# Patient Record
Sex: MALE | Race: WHITE | NOT HISPANIC OR LATINO | Employment: OTHER | URBAN - METROPOLITAN AREA
[De-identification: names, ages, dates, MRNs, and addresses within clinical notes are randomized per-mention and may not be internally consistent; named-entity substitution may affect disease eponyms.]

---

## 2022-09-12 ENCOUNTER — HOSPITAL ENCOUNTER (EMERGENCY)
Facility: MEDICAL CENTER | Age: 67
End: 2022-09-12
Attending: EMERGENCY MEDICINE
Payer: MEDICARE

## 2022-09-12 VITALS
BODY MASS INDEX: 22.5 KG/M2 | WEIGHT: 151.9 LBS | TEMPERATURE: 98 F | OXYGEN SATURATION: 98 % | HEIGHT: 69 IN | SYSTOLIC BLOOD PRESSURE: 159 MMHG | HEART RATE: 64 BPM | DIASTOLIC BLOOD PRESSURE: 62 MMHG | RESPIRATION RATE: 16 BRPM

## 2022-09-12 DIAGNOSIS — R33.9 URINARY RETENTION: ICD-10-CM

## 2022-09-12 PROCEDURE — 99284 EMERGENCY DEPT VISIT MOD MDM: CPT

## 2022-09-12 PROCEDURE — 51798 US URINE CAPACITY MEASURE: CPT

## 2022-09-12 PROCEDURE — 303105 HCHG CATHETER EXTRA

## 2022-09-12 PROCEDURE — 96374 THER/PROPH/DIAG INJ IV PUSH: CPT

## 2022-09-12 PROCEDURE — 700101 HCHG RX REV CODE 250: Performed by: EMERGENCY MEDICINE

## 2022-09-12 PROCEDURE — 700111 HCHG RX REV CODE 636 W/ 250 OVERRIDE (IP): Performed by: EMERGENCY MEDICINE

## 2022-09-12 PROCEDURE — 51702 INSERT TEMP BLADDER CATH: CPT

## 2022-09-12 RX ORDER — HYDROMORPHONE HYDROCHLORIDE 1 MG/ML
1 INJECTION, SOLUTION INTRAMUSCULAR; INTRAVENOUS; SUBCUTANEOUS ONCE
Status: COMPLETED | OUTPATIENT
Start: 2022-09-12 | End: 2022-09-12

## 2022-09-12 RX ORDER — LIDOCAINE HYDROCHLORIDE 20 MG/ML
JELLY TOPICAL
Status: COMPLETED | OUTPATIENT
Start: 2022-09-12 | End: 2022-09-12

## 2022-09-12 RX ADMIN — LIDOCAINE HYDROCHLORIDE 1 DOSE: 20 JELLY TOPICAL at 05:56

## 2022-09-12 RX ADMIN — HYDROMORPHONE HYDROCHLORIDE 1 MG: 1 INJECTION, SOLUTION INTRAMUSCULAR; INTRAVENOUS; SUBCUTANEOUS at 05:56

## 2022-09-12 ASSESSMENT — PAIN DESCRIPTION - PAIN TYPE: TYPE: ACUTE PAIN

## 2022-09-12 NOTE — ED NOTES
Pt was educated on leg bag, cath care at home, and keeping the bag below the bladder level. Pt verbalized understanding. Leg bag applied.

## 2022-09-12 NOTE — ED NOTES
Pt verbalized understanding of d/c instructions and states his nephews wife is picking him up from the lobby. Pt left ambulatory with a steady gait. GCS 15. Leg bag in place, draining clear yellow urine.

## 2022-09-12 NOTE — ED PROVIDER NOTES
ED Provider Note    I briefly evaluated Mr. Yin while waiting for oncoming provider.  He is a 67-year-old man who has a history of urinary retention.  This past evening he voided at 7 PM.  A few hours later he awoke from sleep with a sensation to void and he has been unable to throughout the night.  Now having suprapubic discomfort.  He says previous catheter placements have been difficult and he is required pain medications to assist with placement.  We will attempt to place catheter with a coudé catheter.  IV will be inserted to give pain medication.  I have also ordered Urojet to assist with discomfort during Marcial placement.  Everette Kerr II, M.D. 9/12/2022 5:50 AM

## 2022-09-12 NOTE — ED PROVIDER NOTES
"ED Provider Note    CHIEF COMPLAINT  Chief Complaint   Patient presents with    Unable to Urinate     Last void 1900 9/11       HPI  Abebe Yin is a 67 y.o. male who presents with urinary retention.  Patient has a history of recurrent urinary retention.  He has had bladder stones.  He also has questionable prostate cancer and has a urologist out of town.  He states last night was the last time he urinated.  Woke up with intense urge to pee.  Could not void.  Came to the ER.  He denies fevers chills.  No preceding dysuria.  No flank pain.  Normal bowel movements.  No new medications.  No antihistamines.    REVIEW OF SYSTEMS  As above    PAST MEDICAL HISTORY  Past Medical History:   Diagnosis Date    Cancer (HCC)        SOCIAL HISTORY  Social History     Tobacco Use    Smoking status: Never    Smokeless tobacco: Never   Substance Use Topics    Alcohol use: Not Currently       SURGICAL HISTORY  History reviewed. No pertinent surgical history.    ALLERGIES  No Known Allergies    PHYSICAL EXAM  VITAL SIGNS: BP (!) 144/66   Pulse (!) 58   Temp 37.2 °C (98.9 °F) (Temporal)   Resp 18   Ht 1.753 m (5' 9\")   Wt 68.9 kg (151 lb 14.4 oz)   SpO2 95%   BMI 22.43 kg/m²    Constitutional: Awake and alert. Nontoxic  HENT:  Grossly normal  Eyes: Grossly normal  Neck: Normal range of motion  Cardiovascular: Normal heart rate   Thorax & Lungs: No respiratory distress  Abdomen: Nontender.  Distended bladder  Extremities: Well perfused  Psychiatric: Affect normal      COURSE & MEDICAL DECISION MAKING  Patient presents with acute urinary retention.  A Marcial catheter was placed.  Had a large amount of urine output.  His symptoms resolved.  No infectious symptoms.  Vital signs normal.  No new medications.  No specific cause this is a recurrent issue for him because of the large prostate by his report.  I asked him to follow-up with his urologist as soon as possible.  He will be given a leg bag and the Marcial will be left in " place.  He will return to the ER for retention despite catheter, fevers or other concerning symptoms.    FINAL IMPRESSION  1.  Acute urinary retention      Disposition: home in good condition      This dictation was created using voice recognition software. The accuracy of the dictation is limited to the abilities of the software.  The nursing notes were reviewed and certain aspects of this information were incorporated into this note.      Electronically signed by: Heber Galindo M.D., 9/12/2022 6:32 AM

## 2023-02-03 ENCOUNTER — APPOINTMENT (OUTPATIENT)
Dept: MEDICAL GROUP | Facility: MEDICAL CENTER | Age: 68
End: 2023-02-03
Payer: MEDICARE

## 2023-04-07 ENCOUNTER — HOSPITAL ENCOUNTER (OUTPATIENT)
Dept: RADIOLOGY | Facility: MEDICAL CENTER | Age: 68
End: 2023-04-07
Attending: INTERNAL MEDICINE
Payer: MEDICARE

## 2023-04-07 DIAGNOSIS — D75.81 MYELOFIBROSIS-OSTEOSCLEROSIS (HCC): ICD-10-CM

## 2023-04-07 PROCEDURE — 700117 HCHG RX CONTRAST REV CODE 255: Performed by: INTERNAL MEDICINE

## 2023-04-07 PROCEDURE — 71260 CT THORAX DX C+: CPT

## 2023-04-07 RX ADMIN — IOHEXOL 100 ML: 350 INJECTION, SOLUTION INTRAVENOUS at 16:30

## 2023-04-07 RX ADMIN — IOHEXOL 25 ML: 240 INJECTION, SOLUTION INTRATHECAL; INTRAVASCULAR; INTRAVENOUS; ORAL at 16:30

## 2023-07-05 ENCOUNTER — HOSPITAL ENCOUNTER (OUTPATIENT)
Dept: LAB | Facility: MEDICAL CENTER | Age: 68
End: 2023-07-05
Attending: INTERNAL MEDICINE
Payer: MEDICARE

## 2023-07-05 LAB
ALBUMIN SERPL BCP-MCNC: 4.8 G/DL (ref 3.2–4.9)
ALBUMIN/GLOB SERPL: 1.9 G/DL
ALP SERPL-CCNC: 95 U/L (ref 30–99)
ALT SERPL-CCNC: 20 U/L (ref 2–50)
ANION GAP SERPL CALC-SCNC: 12 MMOL/L (ref 7–16)
AST SERPL-CCNC: 15 U/L (ref 12–45)
BASOPHILS # BLD AUTO: 0.3 % (ref 0–1.8)
BASOPHILS # BLD: 0.02 K/UL (ref 0–0.12)
BILIRUB SERPL-MCNC: 1.4 MG/DL (ref 0.1–1.5)
BUN SERPL-MCNC: 18 MG/DL (ref 8–22)
CALCIUM ALBUM COR SERPL-MCNC: 8.7 MG/DL (ref 8.5–10.5)
CALCIUM SERPL-MCNC: 9.3 MG/DL (ref 8.4–10.2)
CHLORIDE SERPL-SCNC: 107 MMOL/L (ref 96–112)
CO2 SERPL-SCNC: 22 MMOL/L (ref 20–33)
CREAT SERPL-MCNC: 0.76 MG/DL (ref 0.5–1.4)
EOSINOPHIL # BLD AUTO: 0.04 K/UL (ref 0–0.51)
EOSINOPHIL NFR BLD: 0.7 % (ref 0–6.9)
ERYTHROCYTE [DISTWIDTH] IN BLOOD BY AUTOMATED COUNT: 88.1 FL (ref 35.9–50)
FASTING STATUS PATIENT QL REPORTED: NORMAL
GFR SERPLBLD CREATININE-BSD FMLA CKD-EPI: 99 ML/MIN/1.73 M 2
GLOBULIN SER CALC-MCNC: 2.5 G/DL (ref 1.9–3.5)
GLUCOSE SERPL-MCNC: 128 MG/DL (ref 65–99)
HCT VFR BLD AUTO: 36.8 % (ref 42–52)
HGB BLD-MCNC: 12.6 G/DL (ref 14–18)
IMM GRANULOCYTES # BLD AUTO: 0.05 K/UL (ref 0–0.11)
IMM GRANULOCYTES NFR BLD AUTO: 0.8 % (ref 0–0.9)
LDH SERPL L TO P-CCNC: 165 U/L (ref 107–266)
LYMPHOCYTES # BLD AUTO: 3.13 K/UL (ref 1–4.8)
LYMPHOCYTES NFR BLD: 52.4 % (ref 22–41)
MCH RBC QN AUTO: 33.7 PG (ref 27–33)
MCHC RBC AUTO-ENTMCNC: 34.2 G/DL (ref 32.3–36.5)
MCV RBC AUTO: 98.4 FL (ref 81.4–97.8)
MONOCYTES # BLD AUTO: 0.89 K/UL (ref 0–0.85)
MONOCYTES NFR BLD AUTO: 14.9 % (ref 0–13.4)
NEUTROPHILS # BLD AUTO: 1.84 K/UL (ref 1.82–7.42)
NEUTROPHILS NFR BLD: 30.9 % (ref 44–72)
NRBC # BLD AUTO: 0.03 K/UL
NRBC BLD-RTO: 0.5 /100 WBC (ref 0–0.2)
PLATELET # BLD AUTO: 119 K/UL (ref 164–446)
PMV BLD AUTO: 11.1 FL (ref 9–12.9)
POTASSIUM SERPL-SCNC: 4.4 MMOL/L (ref 3.6–5.5)
PROT SERPL-MCNC: 7.3 G/DL (ref 6–8.2)
RBC # BLD AUTO: 3.74 M/UL (ref 4.7–6.1)
SODIUM SERPL-SCNC: 141 MMOL/L (ref 135–145)
WBC # BLD AUTO: 6 K/UL (ref 4.8–10.8)

## 2023-07-05 PROCEDURE — 83615 LACTATE (LD) (LDH) ENZYME: CPT

## 2023-07-05 PROCEDURE — 80053 COMPREHEN METABOLIC PANEL: CPT

## 2023-07-05 PROCEDURE — 36415 COLL VENOUS BLD VENIPUNCTURE: CPT

## 2023-07-05 PROCEDURE — 85025 COMPLETE CBC W/AUTO DIFF WBC: CPT

## 2023-07-10 ENCOUNTER — HOSPITAL ENCOUNTER (OUTPATIENT)
Dept: RADIOLOGY | Facility: MEDICAL CENTER | Age: 68
End: 2023-07-10
Attending: NURSE PRACTITIONER
Payer: MEDICARE

## 2023-07-10 DIAGNOSIS — M26.622 ARTHRALGIA OF LEFT TEMPOROMANDIBULAR JOINT: ICD-10-CM

## 2023-07-10 DIAGNOSIS — R22.0 FACIAL SWELLING: ICD-10-CM

## 2023-07-10 PROCEDURE — 700117 HCHG RX CONTRAST REV CODE 255: Performed by: NURSE PRACTITIONER

## 2023-07-10 PROCEDURE — 70487 CT MAXILLOFACIAL W/DYE: CPT

## 2023-07-10 RX ADMIN — IOHEXOL 80 ML: 350 INJECTION, SOLUTION INTRAVENOUS at 18:22

## 2023-08-06 ENCOUNTER — HOSPITAL ENCOUNTER (EMERGENCY)
Facility: MEDICAL CENTER | Age: 68
End: 2023-08-06
Attending: EMERGENCY MEDICINE
Payer: MEDICARE

## 2023-08-06 VITALS
SYSTOLIC BLOOD PRESSURE: 134 MMHG | DIASTOLIC BLOOD PRESSURE: 76 MMHG | TEMPERATURE: 97 F | RESPIRATION RATE: 16 BRPM | HEIGHT: 69 IN | BODY MASS INDEX: 21.98 KG/M2 | WEIGHT: 148.37 LBS | HEART RATE: 64 BPM | OXYGEN SATURATION: 98 %

## 2023-08-06 DIAGNOSIS — R33.9 URINARY RETENTION: ICD-10-CM

## 2023-08-06 LAB
ANION GAP SERPL CALC-SCNC: 10 MMOL/L (ref 7–16)
APPEARANCE UR: CLEAR
BACTERIA #/AREA URNS HPF: NEGATIVE /HPF
BASOPHILS # BLD AUTO: 0 % (ref 0–1.8)
BASOPHILS # BLD: 0 K/UL (ref 0–0.12)
BILIRUB UR QL STRIP.AUTO: NEGATIVE
BUN SERPL-MCNC: 20 MG/DL (ref 8–22)
CALCIUM SERPL-MCNC: 9 MG/DL (ref 8.4–10.2)
CHLORIDE SERPL-SCNC: 107 MMOL/L (ref 96–112)
CO2 SERPL-SCNC: 23 MMOL/L (ref 20–33)
COLOR UR: YELLOW
CREAT SERPL-MCNC: 0.81 MG/DL (ref 0.5–1.4)
EOSINOPHIL # BLD AUTO: 0 K/UL (ref 0–0.51)
EOSINOPHIL NFR BLD: 0 % (ref 0–6.9)
EPI CELLS #/AREA URNS HPF: ABNORMAL /HPF
ERYTHROCYTE [DISTWIDTH] IN BLOOD BY AUTOMATED COUNT: 93.1 FL (ref 35.9–50)
GFR SERPLBLD CREATININE-BSD FMLA CKD-EPI: 97 ML/MIN/1.73 M 2
GLUCOSE SERPL-MCNC: 116 MG/DL (ref 65–99)
GLUCOSE UR STRIP.AUTO-MCNC: NEGATIVE MG/DL
HCT VFR BLD AUTO: 32.4 % (ref 42–52)
HGB BLD-MCNC: 11 G/DL (ref 14–18)
KETONES UR STRIP.AUTO-MCNC: ABNORMAL MG/DL
LEUKOCYTE ESTERASE UR QL STRIP.AUTO: NEGATIVE
LYMPHOCYTES # BLD AUTO: 1.18 K/UL (ref 1–4.8)
LYMPHOCYTES NFR BLD: 37 % (ref 22–41)
MANUAL DIFF BLD: NORMAL
MCH RBC QN AUTO: 34.4 PG (ref 27–33)
MCHC RBC AUTO-ENTMCNC: 34 G/DL (ref 32.3–36.5)
MCV RBC AUTO: 101.3 FL (ref 81.4–97.8)
MICRO URNS: ABNORMAL
MONOCYTES # BLD AUTO: 0.38 K/UL (ref 0–0.85)
MONOCYTES NFR BLD AUTO: 12 % (ref 0–13.4)
MUCOUS THREADS #/AREA URNS HPF: ABNORMAL /HPF
NEUTROPHILS # BLD AUTO: 1.63 K/UL (ref 1.82–7.42)
NEUTROPHILS NFR BLD: 51 % (ref 44–72)
NITRITE UR QL STRIP.AUTO: NEGATIVE
NRBC # BLD AUTO: 0.07 K/UL
NRBC BLD-RTO: 2.2 /100 WBC (ref 0–0.2)
PH UR STRIP.AUTO: 6.5 [PH] (ref 5–8)
PLATELET # BLD AUTO: 97 K/UL (ref 164–446)
PLATELET BLD QL SMEAR: NORMAL
PMV BLD AUTO: 11.6 FL (ref 9–12.9)
POTASSIUM SERPL-SCNC: 3.9 MMOL/L (ref 3.6–5.5)
PROT UR QL STRIP: NEGATIVE MG/DL
RBC # BLD AUTO: 3.2 M/UL (ref 4.7–6.1)
RBC # URNS HPF: ABNORMAL /HPF
RBC BLD AUTO: NORMAL
RBC UR QL AUTO: ABNORMAL
SODIUM SERPL-SCNC: 140 MMOL/L (ref 135–145)
SP GR UR STRIP.AUTO: 1.02
WBC # BLD AUTO: 3.2 K/UL (ref 4.8–10.8)
WBC #/AREA URNS HPF: ABNORMAL /HPF

## 2023-08-06 PROCEDURE — 700111 HCHG RX REV CODE 636 W/ 250 OVERRIDE (IP): Mod: JZ | Performed by: EMERGENCY MEDICINE

## 2023-08-06 PROCEDURE — 303105 HCHG CATHETER EXTRA

## 2023-08-06 PROCEDURE — 51702 INSERT TEMP BLADDER CATH: CPT

## 2023-08-06 PROCEDURE — 99284 EMERGENCY DEPT VISIT MOD MDM: CPT

## 2023-08-06 PROCEDURE — 85007 BL SMEAR W/DIFF WBC COUNT: CPT

## 2023-08-06 PROCEDURE — 96374 THER/PROPH/DIAG INJ IV PUSH: CPT

## 2023-08-06 PROCEDURE — 81001 URINALYSIS AUTO W/SCOPE: CPT

## 2023-08-06 PROCEDURE — 85025 COMPLETE CBC W/AUTO DIFF WBC: CPT

## 2023-08-06 PROCEDURE — 36415 COLL VENOUS BLD VENIPUNCTURE: CPT

## 2023-08-06 PROCEDURE — 80048 BASIC METABOLIC PNL TOTAL CA: CPT

## 2023-08-06 PROCEDURE — 51798 US URINE CAPACITY MEASURE: CPT

## 2023-08-06 RX ADMIN — FENTANYL CITRATE 50 MCG: 50 INJECTION, SOLUTION INTRAMUSCULAR; INTRAVENOUS at 14:34

## 2023-08-06 ASSESSMENT — FIBROSIS 4 INDEX: FIB4 SCORE: 1.86

## 2023-08-06 NOTE — ED TRIAGE NOTES
"Patient presents with complaints of difficulty urinating. Patient is currently in the middle of radiation treatment for his prostate. States that he hasn't been able to void fully since Friday. Reports small \"dribbles\" over noc but continues to feel full.  "

## 2023-08-06 NOTE — ED PROVIDER NOTES
"ED Provider Note    CHIEF COMPLAINT  Chief Complaint   Patient presents with    Urinary Retention     Last \"normal\" void was 2 days ago       EXTERNAL RECORDS REVIEWED  Outpatient Notes with urology, Dr. Maldonado March 2023, history of prostate cancer    HPI/ROS  LIMITATION TO HISTORY   Select: : None  OUTSIDE HISTORIAN(S):  none    Abebe Yin is a 68 y.o. male who presents with urinary retention.  Patient has a history of prostate cancer currently undergoing treatment.  He reports over the last 7 to 8 days he has noticed some difficulty urinating to the point that since Friday he is really only had a few dribbles and is starting to feel some suprapubic pressure as well.  He reports this is happened before.  No fevers or chills, no nausea or vomiting.  He does have some pain with urination he reports ever since he started his treatment for prostate cancer.  No blood in his urine    PAST MEDICAL HISTORY   has a past medical history of Cancer (HCC).    SURGICAL HISTORY  patient denies any surgical history    FAMILY HISTORY  No family history on file.    SOCIAL HISTORY  Social History     Tobacco Use    Smoking status: Never    Smokeless tobacco: Never   Vaping Use    Vaping Use: Not on file   Substance and Sexual Activity    Alcohol use: Not Currently    Drug use: Not on file    Sexual activity: Not on file       CURRENT MEDICATIONS  Home Medications       Reviewed by Celeste Miner (Pharmacy Tech) on 08/06/23 at 1600  Med List Status: Complete     Medication Last Dose Status   atorvastatin (LIPITOR) 20 MG Tab 8/5/2023 Active   clopidogrel (PLAVIX) 75 MG Tab 8/6/2023 Active   meloxicam (MOBIC) 15 MG tablet 8/5/2023 Active   metformin (GLUCOPHAGE) 1000 MG tablet 8/6/2023 Active   metoprolol tartrate (LOPRESSOR) 25 MG Tab 8/6/2023 Active   potassium citrate SR (UROCIT-K SR) 10 MEQ (1080 MG) Tab CR 8/6/2023 Active                    ALLERGIES  No Known Allergies    PHYSICAL EXAM  VITAL SIGNS: /76   " "Pulse 64   Temp 36.1 °C (97 °F) (Temporal)   Resp 16   Ht 1.753 m (5' 9\")   Wt 67.3 kg (148 lb 5.9 oz)   SpO2 98%   BMI 21.91 kg/m²      Pulse ox interpretation: I interpret this pulse ox as normal.  Constitutional: Alert in no apparent distress.  HENT: No signs of trauma, Bilateral external ears normal, Nose normal.   Eyes: Pupils are equal and reactive, Conjunctiva normal, Non-icteric.   Cardiovascular: Regular rate and rhythm, no murmurs.   Thorax & Lungs: Normal breath sounds, No respiratory distress, No wheezing, No chest tenderness.   Abdomen: Bowel sounds normal, Soft, suprapubic fullness and tenderness, No masses, No pulsatile masses. No peritoneal signs.  Skin: Warm, Dry, No erythema, No rash.   Back: No bony tenderness, No CVA tenderness.   Extremities: Intact distal pulses, No edema, No tenderness, No cyanosis,  Negative Andres's sign.   Neurologic: Alert , Normal motor function, Normal sensory function, No focal deficits noted.   Psychiatric: Affect normal, Judgment normal, Mood normal.         DIAGNOSTIC STUDIES / PROCEDURES  Labs Reviewed   URINALYSIS - Abnormal; Notable for the following components:       Result Value    Ketones Trace (*)     Occult Blood Small (*)     All other components within normal limits   BASIC METABOLIC PANEL - Abnormal; Notable for the following components:    Glucose 116 (*)     All other components within normal limits   URINE MICROSCOPIC (W/UA) - Abnormal; Notable for the following components:    WBC 0-2 (*)     RBC 5-10 (*)     All other components within normal limits   CBC WITH DIFFERENTIAL - Abnormal; Notable for the following components:    WBC 3.2 (*)     RBC 3.20 (*)     Hemoglobin 11.0 (*)     Hematocrit 32.4 (*)     .3 (*)     MCH 34.4 (*)     RDW 93.1 (*)     Platelet Count 97 (*)     Nucleated RBC 2.20 (*)     Neutrophils (Absolute) 1.63 (*)     All other components within normal limits   ESTIMATED GFR   DIFFERENTIAL MANUAL   PLATELET ESTIMATE "   MORPHOLOGY             COURSE & MEDICAL DECISION MAKING      INITIAL ASSESSMENT, COURSE AND PLAN  Care Narrative: 2:22 PM  Patient is evaluated at bedside, at this point differential includes acute urinary retention, obstructive nephropathy, urinary tract infection.  We will plan for bladder scan, urinalysis, metabolic panel.      2:32 PM  Patient's bladder scan shows retention of greater than 500 cc.  We will plan for Marcial, fentanyl for Marcial as he reports he has had significant pain with Marcial placement in the past    3:14 PM  Patient is reevaluated, states he feels much better, Marcial is draining clear urine, no blood.  He does report that he is supposed to get a CBC tomorrow so we will order this now for outpatient follow-up           PROBLEM LIST  #Urinary retention--no findings of concomitant urinary tract infection or obstructive nephropathy.  He already has follow-up scheduled with urology.  We will continue with leg bag and indwelling Marcial     ADDITIONAL PROBLEM LIST  #Prostate cancer, currently followed by urology undergoing treatment      DISPOSITION AND DISCUSSIONS      Escalation of care considered, and ultimately not performed:acute inpatient care management, however at this time, the patient is most appropriate for outpatient management as the urinary obstruction was able to be relieved    Barriers to care at this time, including but not limited to:  none .     Decision tools and prescription drugs considered including, but not limited to: Antibiotics considered but urinalysis shows no findings of infection .     The patient will return for new or worsening symptoms and is stable at the time of discharge.    The patient is referred to a primary physician for blood pressure management, diabetic screening, and for all other preventative health concerns.        DISPOSITION:  Patient will be discharged home in stable condition.    FOLLOW UP:  [facility]  [address] [place]  [address]  [phone]    In 1 week        OUTPATIENT MEDICATIONS:  New Prescriptions    No medications on file         FINAL DIAGNOSIS  1. Urinary retention           Electronically signed by: Wilfredo Frausto M.D., 8/6/2023 2:21 PM

## 2023-09-09 ENCOUNTER — HOSPITAL ENCOUNTER (EMERGENCY)
Facility: MEDICAL CENTER | Age: 68
End: 2023-09-09
Attending: EMERGENCY MEDICINE
Payer: MEDICARE

## 2023-09-09 VITALS
DIASTOLIC BLOOD PRESSURE: 64 MMHG | RESPIRATION RATE: 18 BRPM | TEMPERATURE: 96.7 F | OXYGEN SATURATION: 98 % | WEIGHT: 152.34 LBS | BODY MASS INDEX: 21.81 KG/M2 | HEART RATE: 70 BPM | HEIGHT: 70 IN | SYSTOLIC BLOOD PRESSURE: 115 MMHG

## 2023-09-09 DIAGNOSIS — R33.8 ACUTE URINARY RETENTION: ICD-10-CM

## 2023-09-09 LAB
ALBUMIN SERPL BCP-MCNC: 4.8 G/DL (ref 3.2–4.9)
ALBUMIN/GLOB SERPL: 1.8 G/DL
ALP SERPL-CCNC: 90 U/L (ref 30–99)
ALT SERPL-CCNC: 15 U/L (ref 2–50)
ANION GAP SERPL CALC-SCNC: 10 MMOL/L (ref 7–16)
APPEARANCE UR: CLEAR
AST SERPL-CCNC: 13 U/L (ref 12–45)
BASOPHILS # BLD AUTO: 0.3 % (ref 0–1.8)
BASOPHILS # BLD: 0.01 K/UL (ref 0–0.12)
BILIRUB SERPL-MCNC: 1 MG/DL (ref 0.1–1.5)
BILIRUB UR QL STRIP.AUTO: NEGATIVE
BUN SERPL-MCNC: 16 MG/DL (ref 8–22)
CALCIUM ALBUM COR SERPL-MCNC: 8.2 MG/DL (ref 8.5–10.5)
CALCIUM SERPL-MCNC: 8.8 MG/DL (ref 8.4–10.2)
CHLORIDE SERPL-SCNC: 102 MMOL/L (ref 96–112)
CO2 SERPL-SCNC: 22 MMOL/L (ref 20–33)
COLOR UR: YELLOW
CREAT SERPL-MCNC: 0.79 MG/DL (ref 0.5–1.4)
EOSINOPHIL # BLD AUTO: 0.03 K/UL (ref 0–0.51)
EOSINOPHIL NFR BLD: 0.8 % (ref 0–6.9)
ERYTHROCYTE [DISTWIDTH] IN BLOOD BY AUTOMATED COUNT: 92.5 FL (ref 35.9–50)
GFR SERPLBLD CREATININE-BSD FMLA CKD-EPI: 98 ML/MIN/1.73 M 2
GLOBULIN SER CALC-MCNC: 2.7 G/DL (ref 1.9–3.5)
GLUCOSE SERPL-MCNC: 157 MG/DL (ref 65–99)
GLUCOSE UR STRIP.AUTO-MCNC: NEGATIVE MG/DL
HCT VFR BLD AUTO: 34.9 % (ref 42–52)
HGB BLD-MCNC: 11.8 G/DL (ref 14–18)
IMM GRANULOCYTES # BLD AUTO: 0.08 K/UL (ref 0–0.11)
IMM GRANULOCYTES NFR BLD AUTO: 2.1 % (ref 0–0.9)
KETONES UR STRIP.AUTO-MCNC: NEGATIVE MG/DL
LEUKOCYTE ESTERASE UR QL STRIP.AUTO: NEGATIVE
LYMPHOCYTES # BLD AUTO: 0.57 K/UL (ref 1–4.8)
LYMPHOCYTES NFR BLD: 15 % (ref 22–41)
MCH RBC QN AUTO: 33.4 PG (ref 27–33)
MCHC RBC AUTO-ENTMCNC: 33.8 G/DL (ref 32.3–36.5)
MCV RBC AUTO: 98.9 FL (ref 81.4–97.8)
MICRO URNS: NORMAL
MONOCYTES # BLD AUTO: 0.75 K/UL (ref 0–0.85)
MONOCYTES NFR BLD AUTO: 19.8 % (ref 0–13.4)
NEUTROPHILS # BLD AUTO: 2.35 K/UL (ref 1.82–7.42)
NEUTROPHILS NFR BLD: 62 % (ref 44–72)
NITRITE UR QL STRIP.AUTO: NEGATIVE
NRBC # BLD AUTO: 0.03 K/UL
NRBC BLD-RTO: 0.8 /100 WBC (ref 0–0.2)
PH UR STRIP.AUTO: 6.5 [PH] (ref 5–8)
PLATELET # BLD AUTO: 91 K/UL (ref 164–446)
PLATELETS.RETICULATED NFR BLD AUTO: 10.8 % (ref 0.6–13.1)
PMV BLD AUTO: 11.5 FL (ref 9–12.9)
POTASSIUM SERPL-SCNC: 4.4 MMOL/L (ref 3.6–5.5)
PROT SERPL-MCNC: 7.5 G/DL (ref 6–8.2)
PROT UR QL STRIP: NEGATIVE MG/DL
RBC # BLD AUTO: 3.53 M/UL (ref 4.7–6.1)
RBC UR QL AUTO: NEGATIVE
SODIUM SERPL-SCNC: 134 MMOL/L (ref 135–145)
SP GR UR STRIP.AUTO: 1.01
WBC # BLD AUTO: 3.8 K/UL (ref 4.8–10.8)

## 2023-09-09 PROCEDURE — 85055 RETICULATED PLATELET ASSAY: CPT

## 2023-09-09 PROCEDURE — 87086 URINE CULTURE/COLONY COUNT: CPT

## 2023-09-09 PROCEDURE — 51702 INSERT TEMP BLADDER CATH: CPT

## 2023-09-09 PROCEDURE — 81003 URINALYSIS AUTO W/O SCOPE: CPT

## 2023-09-09 PROCEDURE — 303105 HCHG CATHETER EXTRA

## 2023-09-09 PROCEDURE — 85025 COMPLETE CBC W/AUTO DIFF WBC: CPT

## 2023-09-09 PROCEDURE — 80053 COMPREHEN METABOLIC PANEL: CPT

## 2023-09-09 PROCEDURE — 99285 EMERGENCY DEPT VISIT HI MDM: CPT

## 2023-09-09 PROCEDURE — 51798 US URINE CAPACITY MEASURE: CPT

## 2023-09-09 ASSESSMENT — FIBROSIS 4 INDEX: FIB4 SCORE: 2.28

## 2023-09-09 NOTE — ED PROVIDER NOTES
ED Provider Note    CHIEF COMPLAINT  Chief Complaint   Patient presents with    Unable to Urinate     Last void approx 2000  Pt has had 6 catheters in place        EXTERNAL RECORDS REVIEWED  Reviewed previous urological visits ER visits from August 2023    HPI/ROS  LIMITATION TO HISTORY   None none  OUTSIDE HISTORIAN(S):  None    Abebe Yin is a 68 y.o. male who presents this is a very pleasant 68-year-old gentleman with a history of prostate disease who has had issues with urinary retention in the past.  He has required several emergent catheters most recently approximately a month ago.  He has been referred to urology and is on tamsulosin and is under treatment for prostate cancer followed by Dr. Maldonado with urology.  He has been doing quite well but at approximately 8:00 last night noticed that he could not void he waited throughout the night tried several times but presents here in acute urinary retention.  He denies high fevers or chills.  No change in medications no missed doses of Flomax.    PAST MEDICAL HISTORY   has a past medical history of Cancer (HCC).  Prostate cancer  SURGICAL HISTORY  patient denies any surgical history    FAMILY HISTORY  No family history on file.    SOCIAL HISTORY  Social History     Tobacco Use    Smoking status: Never    Smokeless tobacco: Never   Vaping Use    Vaping Use: Not on file   Substance and Sexual Activity    Alcohol use: Not Currently    Drug use: Not on file    Sexual activity: Not on file       CURRENT MEDICATIONS  Home Medications    **Home medications have not yet been reviewed for this encounter**     No current facility-administered medications for this encounter.    Current Outpatient Medications:     metoprolol tartrate (LOPRESSOR) 25 MG Tab, Take 25 mg by mouth 2 times a day., Disp: , Rfl:     atorvastatin (LIPITOR) 20 MG Tab, Take 20 mg by mouth at bedtime., Disp: , Rfl:     clopidogrel (PLAVIX) 75 MG Tab, Take 75 mg by mouth every morning., Disp:  ", Rfl:     metformin (GLUCOPHAGE) 1000 MG tablet, Take 1,000 mg by mouth 2 times a day with meals., Disp: , Rfl:     potassium citrate SR (UROCIT-K SR) 10 MEQ (1080 MG) Tab CR, Take 10 mEq by mouth 2 times a day., Disp: , Rfl:     meloxicam (MOBIC) 15 MG tablet, Take 15 mg by mouth at bedtime., Disp: , Rfl:       ALLERGIES  No Known Allergies    PHYSICAL EXAM  VITAL SIGNS: BP (!) 162/81   Pulse 70   Temp 35.9 °C (96.7 °F) (Temporal)   Resp (!) 22   Ht 1.778 m (5' 10\")   Wt 69.1 kg (152 lb 5.4 oz)   SpO2 98%   BMI 21.86 kg/m²    Pulse ox interpretation: I interpret this pulse ox as normal.  Constitutional: Alert and oriented x 3, patient appears to be in acute distress  HEENT: Atraumatic normocephalic, pupils are equal round reactive to light extraocular movements are intact. The nares is clear, external ears are normal, mouth shows moist mucous membranes normal dentition for age  Neck: Supple, no JVD no tracheal deviation  Cardiovascular: Regular rate and rhythm no murmur rub or gallop 2+ pulses peripherally x4  Thorax & Lungs: No respiratory distress, no wheezes rales or rhonchi, No chest tenderness.   GI: Suprapubic discomfort without rebound guarding or distention distention in the lower abdomen is noted   skin: Warm dry no acute rash or lesion  Musculoskeletal: Moving all extremities with full range and 5 of 5 strength no acute  deformity  Neurologic: Cranial nerves III through XII are grossly intact no sensory deficit no cerebellar dysfunction   Psychiatric: Appropriate affect for situation at this time          DIAGNOSTIC STUDIES / PROCEDURES  Bladder scan demonstrates 750 cc of retained urine  LABS  Results for orders placed or performed during the hospital encounter of 09/09/23   URINALYSIS (UA)    Specimen: Urine, Marcial Cath   Result Value Ref Range    Color Yellow     Character Clear     Specific Gravity 1.015 <1.035    Ph 6.5 5.0 - 8.0    Glucose Negative Negative mg/dL    Ketones Negative Negative " mg/dL    Protein Negative Negative mg/dL    Bilirubin Negative Negative    Nitrite Negative Negative    Leukocyte Esterase Negative Negative    Occult Blood Negative Negative    Micro Urine Req see below    CBC WITH DIFFERENTIAL   Result Value Ref Range    WBC 3.8 (L) 4.8 - 10.8 K/uL    RBC 3.53 (L) 4.70 - 6.10 M/uL    Hemoglobin 11.8 (L) 14.0 - 18.0 g/dL    Hematocrit 34.9 (L) 42.0 - 52.0 %    MCV 98.9 (H) 81.4 - 97.8 fL    MCH 33.4 (H) 27.0 - 33.0 pg    MCHC 33.8 32.3 - 36.5 g/dL    RDW 92.5 (H) 35.9 - 50.0 fL    Platelet Count 91 (L) 164 - 446 K/uL    MPV 11.5 9.0 - 12.9 fL    Neutrophils-Polys 62.00 44.00 - 72.00 %    Lymphocytes 15.00 (L) 22.00 - 41.00 %    Monocytes 19.80 (H) 0.00 - 13.40 %    Eosinophils 0.80 0.00 - 6.90 %    Basophils 0.30 0.00 - 1.80 %    Immature Granulocytes 2.10 (H) 0.00 - 0.90 %    Nucleated RBC 0.80 (H) 0.00 - 0.20 /100 WBC    Neutrophils (Absolute) 2.35 1.82 - 7.42 K/uL    Lymphs (Absolute) 0.57 (L) 1.00 - 4.80 K/uL    Monos (Absolute) 0.75 0.00 - 0.85 K/uL    Eos (Absolute) 0.03 0.00 - 0.51 K/uL    Baso (Absolute) 0.01 0.00 - 0.12 K/uL    Immature Granulocytes (abs) 0.08 0.00 - 0.11 K/uL    NRBC (Absolute) 0.03 K/uL   Comp Metabolic Panel   Result Value Ref Range    Sodium 134 (L) 135 - 145 mmol/L    Potassium 4.4 3.6 - 5.5 mmol/L    Chloride 102 96 - 112 mmol/L    Co2 22 20 - 33 mmol/L    Anion Gap 10.0 7.0 - 16.0    Glucose 157 (H) 65 - 99 mg/dL    Bun 16 8 - 22 mg/dL    Creatinine 0.79 0.50 - 1.40 mg/dL    Calcium 8.8 8.4 - 10.2 mg/dL    Correct Calcium 8.2 (L) 8.5 - 10.5 mg/dL    AST(SGOT) 13 12 - 45 U/L    ALT(SGPT) 15 2 - 50 U/L    Alkaline Phosphatase 90 30 - 99 U/L    Total Bilirubin 1.0 0.1 - 1.5 mg/dL    Albumin 4.8 3.2 - 4.9 g/dL    Total Protein 7.5 6.0 - 8.2 g/dL    Globulin 2.7 1.9 - 3.5 g/dL    A-G Ratio 1.8 g/dL   IMMATURE PLT FRACTION   Result Value Ref Range    Imm. Plt Fraction 10.8 0.6 - 13.1 %   ESTIMATED GFR   Result Value Ref Range    GFR (CKD-EPI) 98 >60  mL/min/1.73 m 2        RADIOLOGY  I have independently interpreted the diagnostic imaging associated with this visit and am waiting the   Bladder scan revealed 750 cc of retained urine  COURSE & MEDICAL DECISION MAKING    ED Observation Status? No; Patient does not meet criteria for ED Observation.     INITIAL ASSESSMENT, COURSE AND PLAN  Care Narrative:     This is a very pleasant 68-year-old gentleman with a known history of prostate cancer as well as intermittent urinary retention who presents here with severe lower abdominal pain and inability to urinate.  I was concerned about possible hematuria, UTI obstructive uropathy or other etiology of discomfort.  He was quite obvious when the bladder scan revealed 750 cc of retained urine that he was in urinary retention.  Subsequent order for Marcial catheter was successful and the nursing staff placed a Marcial catheter.  He drained around 600 cc of clear yellow urine without hematuria.  His laboratory studies including CBC and metabolic panel are reassuring.  Of note the patient has chronically low white count and did have some immature granulocytes.  He has no signs of sepsis.  I will add on urine culture but there is no clear evidence of UTI.  Metabolic panel is reassuring there is no evidence of acute kidney injury.  Feels much improved and will be discharged home.  I think we can hold off on antibiotic therapy pending urine culture as he has no leukocytosis fever or obvious signs of UTI and has not had previous urinary tract sections related to acute retention in the past.  He already has follow-up with urology      ADDITIONAL PROBLEM LIST    DISPOSITION AND DISCUSSIONS  I have discussed management of the patient with the following physicians and ROBERT's: None    Discussion of management with other QHP or appropriate source(s): None not applicable    Escalation of care considered, and ultimately not performed: Not applicable    Barriers to care at this time, including  but not limited to: Not applicable    Decision tools and prescription drugs considered including, but not limited to: None    FINAL DIAGNOSIS  Acute urinary retention  History of prostate cancer       Electronically signed by: Tay Bernard M.D., 9/9/2023 6:34 AM

## 2023-09-09 NOTE — ED NOTES
16 Fr oh cath inserted pt tolerated well clear yellow urine draining to gravity  PIV established blood colleted and sent to lab

## 2023-09-09 NOTE — ED TRIAGE NOTES
Last void approx 2000   Pt has had 6 catheters in the past  Pt is currently receiving treatment for prostate Cancer.

## 2023-09-11 LAB
BACTERIA UR CULT: NORMAL
SIGNIFICANT IND 70042: NORMAL
SITE SITE: NORMAL
SOURCE SOURCE: NORMAL

## 2023-10-02 ENCOUNTER — HOSPITAL ENCOUNTER (OUTPATIENT)
Dept: LAB | Facility: MEDICAL CENTER | Age: 68
End: 2023-10-02
Attending: INTERNAL MEDICINE
Payer: MEDICARE

## 2023-10-02 LAB
ALBUMIN SERPL BCP-MCNC: 4.9 G/DL (ref 3.2–4.9)
ALBUMIN/GLOB SERPL: 1.8 G/DL
ALP SERPL-CCNC: 107 U/L (ref 30–99)
ALT SERPL-CCNC: 10 U/L (ref 2–50)
ANION GAP SERPL CALC-SCNC: 13 MMOL/L (ref 7–16)
AST SERPL-CCNC: 10 U/L (ref 12–45)
BASOPHILS # BLD AUTO: 0 % (ref 0–1.8)
BASOPHILS # BLD: 0 K/UL (ref 0–0.12)
BILIRUB SERPL-MCNC: 1.1 MG/DL (ref 0.1–1.5)
BUN SERPL-MCNC: 28 MG/DL (ref 8–22)
CALCIUM ALBUM COR SERPL-MCNC: 8.7 MG/DL (ref 8.5–10.5)
CALCIUM SERPL-MCNC: 9.4 MG/DL (ref 8.4–10.2)
CHLORIDE SERPL-SCNC: 101 MMOL/L (ref 96–112)
CO2 SERPL-SCNC: 25 MMOL/L (ref 20–33)
CREAT SERPL-MCNC: 1.43 MG/DL (ref 0.5–1.4)
EOSINOPHIL # BLD AUTO: 0.02 K/UL (ref 0–0.51)
EOSINOPHIL NFR BLD: 0.4 % (ref 0–6.9)
ERYTHROCYTE [DISTWIDTH] IN BLOOD BY AUTOMATED COUNT: 89.6 FL (ref 35.9–50)
GFR SERPLBLD CREATININE-BSD FMLA CKD-EPI: 54 ML/MIN/1.73 M 2
GLOBULIN SER CALC-MCNC: 2.8 G/DL (ref 1.9–3.5)
GLUCOSE SERPL-MCNC: 178 MG/DL (ref 65–99)
HCT VFR BLD AUTO: 35.3 % (ref 42–52)
HGB BLD-MCNC: 12 G/DL (ref 14–18)
IMM GRANULOCYTES # BLD AUTO: 0.05 K/UL (ref 0–0.11)
IMM GRANULOCYTES NFR BLD AUTO: 1.1 % (ref 0–0.9)
LDH SERPL L TO P-CCNC: 152 U/L (ref 107–266)
LYMPHOCYTES # BLD AUTO: 0.64 K/UL (ref 1–4.8)
LYMPHOCYTES NFR BLD: 13.5 % (ref 22–41)
MCH RBC QN AUTO: 33.2 PG (ref 27–33)
MCHC RBC AUTO-ENTMCNC: 34 G/DL (ref 32.3–36.5)
MCV RBC AUTO: 97.8 FL (ref 81.4–97.8)
MONOCYTES # BLD AUTO: 0.63 K/UL (ref 0–0.85)
MONOCYTES NFR BLD AUTO: 13.3 % (ref 0–13.4)
NEUTROPHILS # BLD AUTO: 3.41 K/UL (ref 1.82–7.42)
NEUTROPHILS NFR BLD: 71.7 % (ref 44–72)
NRBC # BLD AUTO: 0.03 K/UL
NRBC BLD-RTO: 0.6 /100 WBC (ref 0–0.2)
PLATELET # BLD AUTO: 104 K/UL (ref 164–446)
PMV BLD AUTO: 11.6 FL (ref 9–12.9)
POTASSIUM SERPL-SCNC: 4.6 MMOL/L (ref 3.6–5.5)
PROT SERPL-MCNC: 7.7 G/DL (ref 6–8.2)
RBC # BLD AUTO: 3.61 M/UL (ref 4.7–6.1)
SODIUM SERPL-SCNC: 139 MMOL/L (ref 135–145)
WBC # BLD AUTO: 4.8 K/UL (ref 4.8–10.8)

## 2023-10-02 PROCEDURE — 83615 LACTATE (LD) (LDH) ENZYME: CPT

## 2023-10-02 PROCEDURE — 36415 COLL VENOUS BLD VENIPUNCTURE: CPT

## 2023-10-02 PROCEDURE — 85025 COMPLETE CBC W/AUTO DIFF WBC: CPT

## 2023-10-02 PROCEDURE — 80053 COMPREHEN METABOLIC PANEL: CPT

## 2023-10-04 ENCOUNTER — HOSPITAL ENCOUNTER (OUTPATIENT)
Dept: LAB | Facility: MEDICAL CENTER | Age: 68
End: 2023-10-04
Attending: NURSE PRACTITIONER
Payer: MEDICARE

## 2023-10-04 ENCOUNTER — HOSPITAL ENCOUNTER (OUTPATIENT)
Dept: RADIOLOGY | Facility: MEDICAL CENTER | Age: 68
End: 2023-10-04
Attending: NURSE PRACTITIONER
Payer: MEDICARE

## 2023-10-04 DIAGNOSIS — K92.1 BLOOD IN STOOL: ICD-10-CM

## 2023-10-04 DIAGNOSIS — R10.32 LLQ PAIN: ICD-10-CM

## 2023-10-04 LAB
ALBUMIN SERPL BCP-MCNC: 4.8 G/DL (ref 3.2–4.9)
ALBUMIN/GLOB SERPL: 1.6 G/DL
ALP SERPL-CCNC: 108 U/L (ref 30–99)
ALT SERPL-CCNC: 12 U/L (ref 2–50)
ANION GAP SERPL CALC-SCNC: 12 MMOL/L (ref 7–16)
AST SERPL-CCNC: 13 U/L (ref 12–45)
BASOPHILS # BLD AUTO: 0.5 % (ref 0–1.8)
BASOPHILS # BLD: 0.02 K/UL (ref 0–0.12)
BILIRUB SERPL-MCNC: 0.9 MG/DL (ref 0.1–1.5)
BUN SERPL-MCNC: 22 MG/DL (ref 8–22)
CALCIUM ALBUM COR SERPL-MCNC: 8.6 MG/DL (ref 8.5–10.5)
CALCIUM SERPL-MCNC: 9.2 MG/DL (ref 8.4–10.2)
CHLORIDE SERPL-SCNC: 106 MMOL/L (ref 96–112)
CO2 SERPL-SCNC: 22 MMOL/L (ref 20–33)
CREAT SERPL-MCNC: 0.97 MG/DL (ref 0.5–1.4)
EOSINOPHIL # BLD AUTO: 0.03 K/UL (ref 0–0.51)
EOSINOPHIL NFR BLD: 0.8 % (ref 0–6.9)
ERYTHROCYTE [DISTWIDTH] IN BLOOD BY AUTOMATED COUNT: 89.2 FL (ref 35.9–50)
FASTING STATUS PATIENT QL REPORTED: NORMAL
GFR SERPLBLD CREATININE-BSD FMLA CKD-EPI: 86 ML/MIN/1.73 M 2
GLOBULIN SER CALC-MCNC: 3 G/DL (ref 1.9–3.5)
GLUCOSE SERPL-MCNC: 107 MG/DL (ref 65–99)
HCT VFR BLD AUTO: 34.8 % (ref 42–52)
HGB BLD-MCNC: 11.9 G/DL (ref 14–18)
IMM GRANULOCYTES # BLD AUTO: 0.08 K/UL (ref 0–0.11)
IMM GRANULOCYTES NFR BLD AUTO: 2 % (ref 0–0.9)
LYMPHOCYTES # BLD AUTO: 0.83 K/UL (ref 1–4.8)
LYMPHOCYTES NFR BLD: 20.9 % (ref 22–41)
MCH RBC QN AUTO: 33.5 PG (ref 27–33)
MCHC RBC AUTO-ENTMCNC: 34.2 G/DL (ref 32.3–36.5)
MCV RBC AUTO: 98 FL (ref 81.4–97.8)
MONOCYTES # BLD AUTO: 0.66 K/UL (ref 0–0.85)
MONOCYTES NFR BLD AUTO: 16.6 % (ref 0–13.4)
NEUTROPHILS # BLD AUTO: 2.36 K/UL (ref 1.82–7.42)
NEUTROPHILS NFR BLD: 59.2 % (ref 44–72)
NRBC # BLD AUTO: 0.02 K/UL
NRBC BLD-RTO: 0.5 /100 WBC (ref 0–0.2)
PLATELET # BLD AUTO: 113 K/UL (ref 164–446)
PMV BLD AUTO: 11.6 FL (ref 9–12.9)
POTASSIUM SERPL-SCNC: 5.1 MMOL/L (ref 3.6–5.5)
PROT SERPL-MCNC: 7.8 G/DL (ref 6–8.2)
RBC # BLD AUTO: 3.55 M/UL (ref 4.7–6.1)
SODIUM SERPL-SCNC: 140 MMOL/L (ref 135–145)
WBC # BLD AUTO: 4 K/UL (ref 4.8–10.8)

## 2023-10-04 PROCEDURE — 85025 COMPLETE CBC W/AUTO DIFF WBC: CPT

## 2023-10-04 PROCEDURE — 74177 CT ABD & PELVIS W/CONTRAST: CPT

## 2023-10-04 PROCEDURE — 80053 COMPREHEN METABOLIC PANEL: CPT

## 2023-10-04 PROCEDURE — 700117 HCHG RX CONTRAST REV CODE 255: Mod: UD | Performed by: NURSE PRACTITIONER

## 2023-10-04 PROCEDURE — 36415 COLL VENOUS BLD VENIPUNCTURE: CPT

## 2023-10-04 RX ADMIN — IOHEXOL 25 ML: 240 INJECTION, SOLUTION INTRATHECAL; INTRAVASCULAR; INTRAVENOUS; ORAL at 17:00

## 2023-10-04 RX ADMIN — IOHEXOL 100 ML: 350 INJECTION, SOLUTION INTRAVENOUS at 17:00

## 2023-12-11 ENCOUNTER — HOSPITAL ENCOUNTER (OUTPATIENT)
Dept: LAB | Facility: MEDICAL CENTER | Age: 68
End: 2023-12-11
Attending: RADIOLOGY
Payer: MEDICARE

## 2023-12-11 LAB — PSA SERPL-MCNC: 0.56 NG/ML (ref 0–4)

## 2023-12-11 PROCEDURE — 36415 COLL VENOUS BLD VENIPUNCTURE: CPT

## 2023-12-11 PROCEDURE — 84153 ASSAY OF PSA TOTAL: CPT

## 2024-01-02 ENCOUNTER — APPOINTMENT (OUTPATIENT)
Dept: RADIOLOGY | Facility: MEDICAL CENTER | Age: 69
DRG: 808 | End: 2024-01-02
Attending: EMERGENCY MEDICINE
Payer: MEDICARE

## 2024-01-02 ENCOUNTER — HOSPITAL ENCOUNTER (INPATIENT)
Facility: MEDICAL CENTER | Age: 69
LOS: 1 days | DRG: 808 | End: 2024-01-03
Attending: EMERGENCY MEDICINE | Admitting: INTERNAL MEDICINE
Payer: MEDICARE

## 2024-01-02 ENCOUNTER — HOSPITAL ENCOUNTER (OUTPATIENT)
Dept: LAB | Facility: MEDICAL CENTER | Age: 69
End: 2024-01-02
Attending: NURSE PRACTITIONER
Payer: MEDICARE

## 2024-01-02 DIAGNOSIS — U07.1 COVID: ICD-10-CM

## 2024-01-02 DIAGNOSIS — D70.9 NEUTROPENIA, UNSPECIFIED TYPE (HCC): ICD-10-CM

## 2024-01-02 LAB
ACANTHOCYTES BLD QL SMEAR: NORMAL
ALBUMIN SERPL BCP-MCNC: 4.5 G/DL (ref 3.2–4.9)
ALBUMIN SERPL BCP-MCNC: 4.8 G/DL (ref 3.2–4.9)
ALBUMIN/GLOB SERPL: 1.7 G/DL
ALBUMIN/GLOB SERPL: 1.7 G/DL
ALP SERPL-CCNC: 90 U/L (ref 30–99)
ALP SERPL-CCNC: 97 U/L (ref 30–99)
ALT SERPL-CCNC: 21 U/L (ref 2–50)
ALT SERPL-CCNC: 24 U/L (ref 2–50)
ANION GAP SERPL CALC-SCNC: 12 MMOL/L (ref 7–16)
ANION GAP SERPL CALC-SCNC: 13 MMOL/L (ref 7–16)
ANISOCYTOSIS BLD QL SMEAR: ABNORMAL
ANISOCYTOSIS BLD QL SMEAR: ABNORMAL
APPEARANCE UR: CLEAR
APTT PPP: >240 SEC (ref 24.7–36)
APTT PPP: >240 SEC (ref 24.7–36)
AST SERPL-CCNC: 16 U/L (ref 12–45)
AST SERPL-CCNC: 18 U/L (ref 12–45)
BASOPHILS # BLD AUTO: 1 % (ref 0–1.8)
BASOPHILS # BLD AUTO: 2.5 % (ref 0–1.8)
BASOPHILS # BLD: 0.01 K/UL (ref 0–0.12)
BASOPHILS # BLD: 0.03 K/UL (ref 0–0.12)
BILIRUB SERPL-MCNC: 0.7 MG/DL (ref 0.1–1.5)
BILIRUB SERPL-MCNC: 0.9 MG/DL (ref 0.1–1.5)
BILIRUB UR QL STRIP.AUTO: NEGATIVE
BUN SERPL-MCNC: 20 MG/DL (ref 8–22)
BUN SERPL-MCNC: 21 MG/DL (ref 8–22)
BURR CELLS BLD QL SMEAR: NORMAL
CALCIUM ALBUM COR SERPL-MCNC: 8.3 MG/DL (ref 8.5–10.5)
CALCIUM ALBUM COR SERPL-MCNC: 8.3 MG/DL (ref 8.5–10.5)
CALCIUM SERPL-MCNC: 8.7 MG/DL (ref 8.5–10.5)
CALCIUM SERPL-MCNC: 8.9 MG/DL (ref 8.4–10.2)
CHLORIDE SERPL-SCNC: 105 MMOL/L (ref 96–112)
CHLORIDE SERPL-SCNC: 106 MMOL/L (ref 96–112)
CHOLEST SERPL-MCNC: 57 MG/DL (ref 100–199)
CK SERPL-CCNC: 41 U/L (ref 0–154)
CO2 SERPL-SCNC: 23 MMOL/L (ref 20–33)
CO2 SERPL-SCNC: 23 MMOL/L (ref 20–33)
COLOR UR: YELLOW
CREAT SERPL-MCNC: 0.89 MG/DL (ref 0.5–1.4)
CREAT SERPL-MCNC: 0.97 MG/DL (ref 0.5–1.4)
CREAT UR-MCNC: 266.67 MG/DL
CRP SERPL HS-MCNC: 0.53 MG/DL (ref 0–0.75)
DACRYOCYTES BLD QL SMEAR: NORMAL
DACRYOCYTES BLD QL SMEAR: NORMAL
EKG IMPRESSION: NORMAL
EOSINOPHIL # BLD AUTO: 0.03 K/UL (ref 0–0.51)
EOSINOPHIL # BLD AUTO: 0.03 K/UL (ref 0–0.51)
EOSINOPHIL NFR BLD: 2 % (ref 0–6.9)
EOSINOPHIL NFR BLD: 2.4 % (ref 0–6.9)
ERYTHROCYTE [DISTWIDTH] IN BLOOD BY AUTOMATED COUNT: 92.2 FL (ref 35.9–50)
ERYTHROCYTE [DISTWIDTH] IN BLOOD BY AUTOMATED COUNT: 93.2 FL (ref 35.9–50)
ERYTHROCYTE [SEDIMENTATION RATE] IN BLOOD BY WESTERGREN METHOD: 22 MM/HOUR (ref 0–20)
EST. AVERAGE GLUCOSE BLD GHB EST-MCNC: 140 MG/DL
FLUAV RNA SPEC QL NAA+PROBE: NEGATIVE
FLUBV RNA SPEC QL NAA+PROBE: NEGATIVE
GFR SERPLBLD CREATININE-BSD FMLA CKD-EPI: 86 ML/MIN/1.73 M 2
GFR SERPLBLD CREATININE-BSD FMLA CKD-EPI: 94 ML/MIN/1.73 M 2
GLOBULIN SER CALC-MCNC: 2.7 G/DL (ref 1.9–3.5)
GLOBULIN SER CALC-MCNC: 2.8 G/DL (ref 1.9–3.5)
GLUCOSE SERPL-MCNC: 109 MG/DL (ref 65–99)
GLUCOSE SERPL-MCNC: 141 MG/DL (ref 65–99)
GLUCOSE UR STRIP.AUTO-MCNC: NEGATIVE MG/DL
HBA1C MFR BLD: 6.5 % (ref 4–5.6)
HCT VFR BLD AUTO: 33.6 % (ref 42–52)
HCT VFR BLD AUTO: 36.7 % (ref 42–52)
HDLC SERPL-MCNC: 21 MG/DL
HGB BLD-MCNC: 11.3 G/DL (ref 14–18)
HGB BLD-MCNC: 12.3 G/DL (ref 14–18)
INR PPP: 1.07 (ref 0.87–1.13)
KETONES UR STRIP.AUTO-MCNC: NEGATIVE MG/DL
LACTATE SERPL-SCNC: 1.9 MMOL/L (ref 0.5–2)
LDLC SERPL CALC-MCNC: 22 MG/DL
LEUKOCYTE ESTERASE UR QL STRIP.AUTO: NEGATIVE
LG PLATELETS BLD QL SMEAR: NORMAL
LYMPHOCYTES # BLD AUTO: 0.6 K/UL (ref 1–4.8)
LYMPHOCYTES # BLD AUTO: 0.77 K/UL (ref 1–4.8)
LYMPHOCYTES NFR BLD: 42.6 % (ref 22–41)
LYMPHOCYTES NFR BLD: 59 % (ref 22–41)
MACROCYTES BLD QL SMEAR: ABNORMAL
MANUAL DIFF BLD: NORMAL
MANUAL DIFF BLD: NORMAL
MCH RBC QN AUTO: 32.6 PG (ref 27–33)
MCH RBC QN AUTO: 33.2 PG (ref 27–33)
MCHC RBC AUTO-ENTMCNC: 33.5 G/DL (ref 32.3–36.5)
MCHC RBC AUTO-ENTMCNC: 33.6 G/DL (ref 32.3–36.5)
MCV RBC AUTO: 96.8 FL (ref 81.4–97.8)
MCV RBC AUTO: 98.9 FL (ref 81.4–97.8)
METAMYELOCYTES NFR BLD MANUAL: 3.3 %
MICRO URNS: NORMAL
MICROALBUMIN UR-MCNC: 22.8 MG/DL
MICROALBUMIN/CREAT UR: 85 MG/G (ref 0–30)
MICROCYTES BLD QL SMEAR: ABNORMAL
MICROCYTES BLD QL SMEAR: ABNORMAL
MONOCYTES # BLD AUTO: 0.18 K/UL (ref 0–0.85)
MONOCYTES # BLD AUTO: 0.3 K/UL (ref 0–0.85)
MONOCYTES NFR BLD AUTO: 14 % (ref 0–13.4)
MONOCYTES NFR BLD AUTO: 21.3 % (ref 0–13.4)
MORPHOLOGY BLD-IMP: NORMAL
NEUTROPHILS # BLD AUTO: 0.31 K/UL (ref 1.82–7.42)
NEUTROPHILS # BLD AUTO: 0.39 K/UL (ref 1.82–7.42)
NEUTROPHILS NFR BLD: 19 % (ref 44–72)
NEUTROPHILS NFR BLD: 23.8 % (ref 44–72)
NEUTS BAND NFR BLD MANUAL: 4.1 % (ref 0–10)
NEUTS BAND NFR BLD MANUAL: 5 % (ref 0–10)
NITRITE UR QL STRIP.AUTO: NEGATIVE
NRBC # BLD AUTO: 0 K/UL
NRBC # BLD AUTO: 0.02 K/UL
NRBC BLD-RTO: 0 /100 WBC (ref 0–0.2)
NRBC BLD-RTO: 1.4 /100 WBC (ref 0–0.2)
OVALOCYTES BLD QL SMEAR: NORMAL
OVALOCYTES BLD QL SMEAR: NORMAL
PH UR STRIP.AUTO: 5 [PH] (ref 5–8)
PLATELET # BLD AUTO: 78 K/UL (ref 164–446)
PLATELET # BLD AUTO: 82 K/UL (ref 164–446)
PLATELET BLD QL SMEAR: NORMAL
PLATELET BLD QL SMEAR: NORMAL
PLATELETS.RETICULATED NFR BLD AUTO: 11.3 % (ref 0.6–13.1)
PLATELETS.RETICULATED NFR BLD AUTO: 9.9 % (ref 0.6–13.1)
POIKILOCYTOSIS BLD QL SMEAR: NORMAL
POIKILOCYTOSIS BLD QL SMEAR: NORMAL
POTASSIUM SERPL-SCNC: 4.5 MMOL/L (ref 3.6–5.5)
POTASSIUM SERPL-SCNC: 4.8 MMOL/L (ref 3.6–5.5)
PROCALCITONIN SERPL-MCNC: <0.05 NG/ML
PROT SERPL-MCNC: 7.2 G/DL (ref 6–8.2)
PROT SERPL-MCNC: 7.6 G/DL (ref 6–8.2)
PROT UR QL STRIP: NEGATIVE MG/DL
PROTHROMBIN TIME: 14 SEC (ref 12–14.6)
RBC # BLD AUTO: 3.47 M/UL (ref 4.7–6.1)
RBC # BLD AUTO: 3.71 M/UL (ref 4.7–6.1)
RBC BLD AUTO: PRESENT
RBC BLD AUTO: PRESENT
RBC UR QL AUTO: NEGATIVE
RSV RNA SPEC QL NAA+PROBE: NEGATIVE
SARS-COV-2 RNA RESP QL NAA+PROBE: DETECTED
SCHISTOCYTES BLD QL SMEAR: NORMAL
SCHISTOCYTES BLD QL SMEAR: NORMAL
SODIUM SERPL-SCNC: 141 MMOL/L (ref 135–145)
SODIUM SERPL-SCNC: 141 MMOL/L (ref 135–145)
SP GR UR STRIP.AUTO: 1.02
T4 FREE SERPL-MCNC: 1.44 NG/DL (ref 0.93–1.7)
TRIGL SERPL-MCNC: 72 MG/DL (ref 0–149)
TSH SERPL DL<=0.005 MIU/L-ACNC: 2.16 UIU/ML (ref 0.38–5.33)
UROBILINOGEN UR STRIP.AUTO-MCNC: 0.2 MG/DL
VARIANT LYMPHS BLD QL SMEAR: NORMAL
WBC # BLD AUTO: 1.3 K/UL (ref 4.8–10.8)
WBC # BLD AUTO: 1.4 K/UL (ref 4.8–10.8)

## 2024-01-02 PROCEDURE — 93005 ELECTROCARDIOGRAM TRACING: CPT | Performed by: EMERGENCY MEDICINE

## 2024-01-02 PROCEDURE — 87040 BLOOD CULTURE FOR BACTERIA: CPT | Mod: 91

## 2024-01-02 PROCEDURE — 770001 HCHG ROOM/CARE - MED/SURG/GYN PRIV*

## 2024-01-02 PROCEDURE — 71045 X-RAY EXAM CHEST 1 VIEW: CPT

## 2024-01-02 PROCEDURE — 84443 ASSAY THYROID STIM HORMONE: CPT

## 2024-01-02 PROCEDURE — 80503 PATH CLIN CONSLTJ SF 5-20: CPT | Mod: XU

## 2024-01-02 PROCEDURE — 700105 HCHG RX REV CODE 258: Mod: UD | Performed by: EMERGENCY MEDICINE

## 2024-01-02 PROCEDURE — 85027 COMPLETE CBC AUTOMATED: CPT

## 2024-01-02 PROCEDURE — 80061 LIPID PANEL: CPT

## 2024-01-02 PROCEDURE — 36415 COLL VENOUS BLD VENIPUNCTURE: CPT

## 2024-01-02 PROCEDURE — 83036 HEMOGLOBIN GLYCOSYLATED A1C: CPT | Mod: GA

## 2024-01-02 PROCEDURE — 99285 EMERGENCY DEPT VISIT HI MDM: CPT

## 2024-01-02 PROCEDURE — 82550 ASSAY OF CK (CPK): CPT

## 2024-01-02 PROCEDURE — 80053 COMPREHEN METABOLIC PANEL: CPT

## 2024-01-02 PROCEDURE — 83605 ASSAY OF LACTIC ACID: CPT

## 2024-01-02 PROCEDURE — 85730 THROMBOPLASTIN TIME PARTIAL: CPT

## 2024-01-02 PROCEDURE — 0241U HCHG SARS-COV-2 COVID-19 NFCT DS RESP RNA 4 TRGT ED POC: CPT

## 2024-01-02 PROCEDURE — 85007 BL SMEAR W/DIFF WBC COUNT: CPT

## 2024-01-02 PROCEDURE — 85027 COMPLETE CBC AUTOMATED: CPT | Mod: 91

## 2024-01-02 PROCEDURE — 85652 RBC SED RATE AUTOMATED: CPT

## 2024-01-02 PROCEDURE — 81003 URINALYSIS AUTO W/O SCOPE: CPT

## 2024-01-02 PROCEDURE — 99223 1ST HOSP IP/OBS HIGH 75: CPT | Mod: A1 | Performed by: INTERNAL MEDICINE

## 2024-01-02 PROCEDURE — 700102 HCHG RX REV CODE 250 W/ 637 OVERRIDE(OP): Performed by: INTERNAL MEDICINE

## 2024-01-02 PROCEDURE — 82043 UR ALBUMIN QUANTITATIVE: CPT

## 2024-01-02 PROCEDURE — 84439 ASSAY OF FREE THYROXINE: CPT

## 2024-01-02 PROCEDURE — 86140 C-REACTIVE PROTEIN: CPT

## 2024-01-02 PROCEDURE — 85055 RETICULATED PLATELET ASSAY: CPT

## 2024-01-02 PROCEDURE — A9270 NON-COVERED ITEM OR SERVICE: HCPCS | Performed by: INTERNAL MEDICINE

## 2024-01-02 PROCEDURE — 84145 PROCALCITONIN (PCT): CPT

## 2024-01-02 PROCEDURE — 85610 PROTHROMBIN TIME: CPT

## 2024-01-02 PROCEDURE — 85055 RETICULATED PLATELET ASSAY: CPT | Mod: 91

## 2024-01-02 PROCEDURE — 82570 ASSAY OF URINE CREATININE: CPT

## 2024-01-02 PROCEDURE — 85007 BL SMEAR W/DIFF WBC COUNT: CPT | Mod: 91

## 2024-01-02 PROCEDURE — 80053 COMPREHEN METABOLIC PANEL: CPT | Mod: 91

## 2024-01-02 RX ORDER — POLYETHYLENE GLYCOL 3350 17 G/17G
1 POWDER, FOR SOLUTION ORAL
Status: DISCONTINUED | OUTPATIENT
Start: 2024-01-02 | End: 2024-01-03 | Stop reason: HOSPADM

## 2024-01-02 RX ORDER — FINASTERIDE 5 MG/1
5 TABLET, FILM COATED ORAL DAILY
COMMUNITY
Start: 2023-11-29

## 2024-01-02 RX ORDER — FINASTERIDE 5 MG/1
5 TABLET, FILM COATED ORAL DAILY
Status: DISCONTINUED | OUTPATIENT
Start: 2024-01-03 | End: 2024-01-03 | Stop reason: HOSPADM

## 2024-01-02 RX ORDER — OXYCODONE HYDROCHLORIDE 5 MG/1
2.5 TABLET ORAL
Status: DISCONTINUED | OUTPATIENT
Start: 2024-01-02 | End: 2024-01-03 | Stop reason: HOSPADM

## 2024-01-02 RX ORDER — BISACODYL 10 MG
10 SUPPOSITORY, RECTAL RECTAL
Status: DISCONTINUED | OUTPATIENT
Start: 2024-01-02 | End: 2024-01-03 | Stop reason: HOSPADM

## 2024-01-02 RX ORDER — ALFUZOSIN HYDROCHLORIDE 10 MG/1
10 TABLET, EXTENDED RELEASE ORAL DAILY
COMMUNITY
Start: 2023-08-09

## 2024-01-02 RX ORDER — ONDANSETRON 2 MG/ML
4 INJECTION INTRAMUSCULAR; INTRAVENOUS EVERY 4 HOURS PRN
Status: DISCONTINUED | OUTPATIENT
Start: 2024-01-02 | End: 2024-01-03 | Stop reason: HOSPADM

## 2024-01-02 RX ORDER — MELOXICAM 7.5 MG/1
15 TABLET ORAL
Status: DISCONTINUED | OUTPATIENT
Start: 2024-01-02 | End: 2024-01-03 | Stop reason: HOSPADM

## 2024-01-02 RX ORDER — ONDANSETRON 4 MG/1
4 TABLET, ORALLY DISINTEGRATING ORAL EVERY 4 HOURS PRN
Status: DISCONTINUED | OUTPATIENT
Start: 2024-01-02 | End: 2024-01-03 | Stop reason: HOSPADM

## 2024-01-02 RX ORDER — SODIUM CHLORIDE, SODIUM LACTATE, POTASSIUM CHLORIDE, AND CALCIUM CHLORIDE .6; .31; .03; .02 G/100ML; G/100ML; G/100ML; G/100ML
1000 INJECTION, SOLUTION INTRAVENOUS ONCE
Status: COMPLETED | OUTPATIENT
Start: 2024-01-02 | End: 2024-01-02

## 2024-01-02 RX ORDER — AMOXICILLIN 250 MG
2 CAPSULE ORAL 2 TIMES DAILY
Status: DISCONTINUED | OUTPATIENT
Start: 2024-01-02 | End: 2024-01-03 | Stop reason: HOSPADM

## 2024-01-02 RX ORDER — ENOXAPARIN SODIUM 100 MG/ML
40 INJECTION SUBCUTANEOUS DAILY
Status: DISCONTINUED | OUTPATIENT
Start: 2024-01-02 | End: 2024-01-03 | Stop reason: HOSPADM

## 2024-01-02 RX ORDER — CLOPIDOGREL BISULFATE 75 MG/1
75 TABLET ORAL EVERY MORNING
Status: DISCONTINUED | OUTPATIENT
Start: 2024-01-03 | End: 2024-01-03 | Stop reason: HOSPADM

## 2024-01-02 RX ORDER — ATORVASTATIN CALCIUM 20 MG/1
20 TABLET, FILM COATED ORAL
Status: DISCONTINUED | OUTPATIENT
Start: 2024-01-02 | End: 2024-01-03 | Stop reason: HOSPADM

## 2024-01-02 RX ORDER — HYDROMORPHONE HYDROCHLORIDE 1 MG/ML
0.25 INJECTION, SOLUTION INTRAMUSCULAR; INTRAVENOUS; SUBCUTANEOUS
Status: DISCONTINUED | OUTPATIENT
Start: 2024-01-02 | End: 2024-01-03 | Stop reason: HOSPADM

## 2024-01-02 RX ORDER — TAMSULOSIN HYDROCHLORIDE 0.4 MG/1
0.4 CAPSULE ORAL DAILY
Status: DISCONTINUED | OUTPATIENT
Start: 2024-01-03 | End: 2024-01-03 | Stop reason: HOSPADM

## 2024-01-02 RX ORDER — LABETALOL HYDROCHLORIDE 5 MG/ML
10 INJECTION, SOLUTION INTRAVENOUS EVERY 4 HOURS PRN
Status: DISCONTINUED | OUTPATIENT
Start: 2024-01-02 | End: 2024-01-03 | Stop reason: HOSPADM

## 2024-01-02 RX ORDER — OXYCODONE HYDROCHLORIDE 5 MG/1
5 TABLET ORAL
Status: DISCONTINUED | OUTPATIENT
Start: 2024-01-02 | End: 2024-01-03 | Stop reason: HOSPADM

## 2024-01-02 RX ORDER — ACETAMINOPHEN 325 MG/1
650 TABLET ORAL EVERY 6 HOURS PRN
Status: DISCONTINUED | OUTPATIENT
Start: 2024-01-02 | End: 2024-01-03 | Stop reason: HOSPADM

## 2024-01-02 RX ADMIN — ATORVASTATIN CALCIUM 20 MG: 20 TABLET, FILM COATED ORAL at 20:53

## 2024-01-02 RX ADMIN — METOPROLOL TARTRATE 25 MG: 25 TABLET, FILM COATED ORAL at 20:53

## 2024-01-02 RX ADMIN — SODIUM CHLORIDE, POTASSIUM CHLORIDE, SODIUM LACTATE AND CALCIUM CHLORIDE 1000 ML: 600; 310; 30; 20 INJECTION, SOLUTION INTRAVENOUS at 18:48

## 2024-01-02 RX ADMIN — MELOXICAM 15 MG: 7.5 TABLET ORAL at 22:06

## 2024-01-02 ASSESSMENT — PATIENT HEALTH QUESTIONNAIRE - PHQ9
SUM OF ALL RESPONSES TO PHQ9 QUESTIONS 1 AND 2: 0
2. FEELING DOWN, DEPRESSED, IRRITABLE, OR HOPELESS: NOT AT ALL
1. LITTLE INTEREST OR PLEASURE IN DOING THINGS: NOT AT ALL

## 2024-01-02 ASSESSMENT — FIBROSIS 4 INDEX
FIB4 SCORE: 2.81
FIB4 SCORE: 3.11

## 2024-01-02 ASSESSMENT — PAIN DESCRIPTION - PAIN TYPE: TYPE: ACUTE PAIN

## 2024-01-03 VITALS
RESPIRATION RATE: 16 BRPM | WEIGHT: 152.56 LBS | OXYGEN SATURATION: 97 % | BODY MASS INDEX: 21.84 KG/M2 | HEIGHT: 70 IN | SYSTOLIC BLOOD PRESSURE: 100 MMHG | DIASTOLIC BLOOD PRESSURE: 57 MMHG | HEART RATE: 62 BPM | TEMPERATURE: 98.1 F

## 2024-01-03 PROBLEM — I25.10 CAD (CORONARY ARTERY DISEASE): Status: ACTIVE | Noted: 2024-01-03

## 2024-01-03 PROBLEM — U07.1 COVID-19: Status: ACTIVE | Noted: 2024-01-03

## 2024-01-03 PROBLEM — R79.1 ELEVATED PARTIAL THROMBOPLASTIN TIME (PTT): Status: ACTIVE | Noted: 2024-01-03

## 2024-01-03 PROBLEM — D61.818 PANCYTOPENIA (HCC): Status: ACTIVE | Noted: 2024-01-03

## 2024-01-03 LAB
ALBUMIN SERPL BCP-MCNC: 4 G/DL (ref 3.2–4.9)
ALBUMIN/GLOB SERPL: 1.5 G/DL
ALP SERPL-CCNC: 85 U/L (ref 30–99)
ALT SERPL-CCNC: 16 U/L (ref 2–50)
ANION GAP SERPL CALC-SCNC: 10 MMOL/L (ref 7–16)
ANISOCYTOSIS BLD QL SMEAR: ABNORMAL
AST SERPL-CCNC: 12 U/L (ref 12–45)
BASOPHILS # BLD AUTO: 0.9 % (ref 0–1.8)
BASOPHILS # BLD: 0.01 K/UL (ref 0–0.12)
BILIRUB SERPL-MCNC: 0.7 MG/DL (ref 0.1–1.5)
BUN SERPL-MCNC: 20 MG/DL (ref 8–22)
CALCIUM ALBUM COR SERPL-MCNC: 8.5 MG/DL (ref 8.5–10.5)
CALCIUM SERPL-MCNC: 8.5 MG/DL (ref 8.5–10.5)
CHLORIDE SERPL-SCNC: 107 MMOL/L (ref 96–112)
CO2 SERPL-SCNC: 25 MMOL/L (ref 20–33)
COMMENT NL1176: NORMAL
CREAT SERPL-MCNC: 0.75 MG/DL (ref 0.5–1.4)
CRP SERPL HS-MCNC: 0.39 MG/DL (ref 0–0.75)
DACRYOCYTES BLD QL SMEAR: NORMAL
EOSINOPHIL # BLD AUTO: 0.03 K/UL (ref 0–0.51)
EOSINOPHIL NFR BLD: 1.7 % (ref 0–6.9)
ERYTHROCYTE [DISTWIDTH] IN BLOOD BY AUTOMATED COUNT: 91.6 FL (ref 35.9–50)
GFR SERPLBLD CREATININE-BSD FMLA CKD-EPI: 99 ML/MIN/1.73 M 2
GLOBULIN SER CALC-MCNC: 2.6 G/DL (ref 1.9–3.5)
GLUCOSE SERPL-MCNC: 117 MG/DL (ref 65–99)
HCT VFR BLD AUTO: 31.3 % (ref 42–52)
HGB BLD-MCNC: 10.6 G/DL (ref 14–18)
HYPOCHROMIA BLD QL SMEAR: ABNORMAL
LYMPHOCYTES # BLD AUTO: 0.87 K/UL (ref 1–4.8)
LYMPHOCYTES NFR BLD: 57.8 % (ref 22–41)
MANUAL DIFF BLD: NORMAL
MCH RBC QN AUTO: 32.7 PG (ref 27–33)
MCHC RBC AUTO-ENTMCNC: 33.9 G/DL (ref 32.3–36.5)
MCV RBC AUTO: 96.6 FL (ref 81.4–97.8)
METAMYELOCYTES NFR BLD MANUAL: 0.9 %
MICROCYTES BLD QL SMEAR: ABNORMAL
MONOCYTES # BLD AUTO: 0.22 K/UL (ref 0–0.85)
MONOCYTES NFR BLD AUTO: 14.6 % (ref 0–13.4)
MORPHOLOGY BLD-IMP: NORMAL
NEUTROPHILS # BLD AUTO: 0.36 K/UL (ref 1.82–7.42)
NEUTROPHILS NFR BLD: 21.5 % (ref 44–72)
NEUTS BAND NFR BLD MANUAL: 2.6 % (ref 0–10)
NRBC # BLD AUTO: 0 K/UL
NRBC BLD-RTO: 0 /100 WBC (ref 0–0.2)
OVALOCYTES BLD QL SMEAR: NORMAL
PATH REV: NORMAL
PATH REV: NORMAL
PLATELET # BLD AUTO: 80 K/UL (ref 164–446)
PLATELET BLD QL SMEAR: NORMAL
PLATELETS.RETICULATED NFR BLD AUTO: 11 % (ref 0.6–13.1)
POIKILOCYTOSIS BLD QL SMEAR: NORMAL
POTASSIUM SERPL-SCNC: 3.9 MMOL/L (ref 3.6–5.5)
PROT SERPL-MCNC: 6.6 G/DL (ref 6–8.2)
RBC # BLD AUTO: 3.24 M/UL (ref 4.7–6.1)
RBC BLD AUTO: PRESENT
SCHISTOCYTES BLD QL SMEAR: NORMAL
SODIUM SERPL-SCNC: 142 MMOL/L (ref 135–145)
WBC # BLD AUTO: 1.5 K/UL (ref 4.8–10.8)

## 2024-01-03 PROCEDURE — 86140 C-REACTIVE PROTEIN: CPT

## 2024-01-03 PROCEDURE — 36415 COLL VENOUS BLD VENIPUNCTURE: CPT

## 2024-01-03 PROCEDURE — 80053 COMPREHEN METABOLIC PANEL: CPT

## 2024-01-03 PROCEDURE — 85027 COMPLETE CBC AUTOMATED: CPT

## 2024-01-03 PROCEDURE — 700102 HCHG RX REV CODE 250 W/ 637 OVERRIDE(OP): Performed by: INTERNAL MEDICINE

## 2024-01-03 PROCEDURE — A9270 NON-COVERED ITEM OR SERVICE: HCPCS | Performed by: INTERNAL MEDICINE

## 2024-01-03 PROCEDURE — 85055 RETICULATED PLATELET ASSAY: CPT

## 2024-01-03 PROCEDURE — 85007 BL SMEAR W/DIFF WBC COUNT: CPT

## 2024-01-03 PROCEDURE — 99239 HOSP IP/OBS DSCHRG MGMT >30: CPT | Performed by: STUDENT IN AN ORGANIZED HEALTH CARE EDUCATION/TRAINING PROGRAM

## 2024-01-03 RX ADMIN — METOPROLOL TARTRATE 25 MG: 25 TABLET, FILM COATED ORAL at 05:27

## 2024-01-03 RX ADMIN — TAMSULOSIN HYDROCHLORIDE 0.4 MG: 0.4 CAPSULE ORAL at 05:26

## 2024-01-03 RX ADMIN — FINASTERIDE 5 MG: 5 TABLET, FILM COATED ORAL at 05:27

## 2024-01-03 RX ADMIN — CLOPIDOGREL BISULFATE 75 MG: 75 TABLET ORAL at 05:27

## 2024-01-03 ASSESSMENT — COGNITIVE AND FUNCTIONAL STATUS - GENERAL
SUGGESTED CMS G CODE MODIFIER MOBILITY: CH
SUGGESTED CMS G CODE MODIFIER DAILY ACTIVITY: CH
DAILY ACTIVITIY SCORE: 24
MOBILITY SCORE: 24

## 2024-01-03 ASSESSMENT — ENCOUNTER SYMPTOMS
BLOOD IN STOOL: 0
SPUTUM PRODUCTION: 0
HEADACHES: 0
TREMORS: 0
DIAPHORESIS: 0
FEVER: 1
ORTHOPNEA: 0
COUGH: 1
RESPIRATORY NEGATIVE: 1
MYALGIAS: 1
INSOMNIA: 0
EYES NEGATIVE: 1
COUGH: 0
NERVOUS/ANXIOUS: 0
CONSTIPATION: 0
MEMORY LOSS: 0
MUSCULOSKELETAL NEGATIVE: 1
CARDIOVASCULAR NEGATIVE: 1
FEVER: 0
WEIGHT LOSS: 0
HALLUCINATIONS: 0
GASTROINTESTINAL NEGATIVE: 1
PALPITATIONS: 0
CHILLS: 1
MYALGIAS: 0
VOMITING: 0
NECK PAIN: 0
NAUSEA: 0
SPEECH CHANGE: 0
HEMOPTYSIS: 0
BACK PAIN: 0
DIARRHEA: 0
PND: 0
DIZZINESS: 0
BRUISES/BLEEDS EASILY: 0
PHOTOPHOBIA: 0
POLYDIPSIA: 0
FLANK PAIN: 0
DOUBLE VISION: 0
HEARTBURN: 0
DEPRESSION: 0
BLURRED VISION: 0
FOCAL WEAKNESS: 0
NEUROLOGICAL NEGATIVE: 1
ABDOMINAL PAIN: 0
PSYCHIATRIC NEGATIVE: 1

## 2024-01-03 ASSESSMENT — LIFESTYLE VARIABLES: SUBSTANCE_ABUSE: 0

## 2024-01-03 NOTE — ED NOTES
Nanci from Lab called with critical result of PTT >240.0 at 2036. Critical lab result read back to Nanci.   Dr. Rodgers notified of critical lab result at 2037.  Critical lab result read back by Dr. Rodgers.

## 2024-01-03 NOTE — H&P
"Hospital Medicine History & Physical Note    Date of Service  1/2/2024    Primary Care Physician  ELMO Guallpa.      Code Status  Full Code    Chief Complaint  Chief Complaint   Patient presents with    Sent by MD    Abnormal Labs     Reports low WBC. 1.3.   Reports hx of CA. Including bone marrow cancer. No current chemo or radiation.     Been sick since Saturday with cold s/sx. Cough, chills, generalized weakness and body aches.        History of Presenting Illness  Abebe Yin is a 68 y.o. male with past medical history of prostate cancer, CAD, type 2 diabetes, \"bone cancer\" who presented 1/2/2024 with pancytopenia..  Patient had routine labs done today ordered by PCP, that came back showing pancytopenia.  Therefore he was referred to ER.  According to the patient he was diagnosed with \"bone cancer\" by biopsy years ago outside of [facility].  It is unclear what treatment he received.  Recently he has been seen by a local oncologist in [place], but he is unable to give me any names.  He appears to be poor historian  Patient has been having cough, chills, body aches since Saturday.  He is afebrile, on room air.  Chest x-ray negative for consolidation.  His COVID test came back positive.      I discussed the plan of care with patient and ERP .    Review of Systems  Review of Systems   Constitutional:  Positive for chills and malaise/fatigue. Negative for fever and weight loss.   HENT:  Negative for ear pain, hearing loss and tinnitus.    Eyes:  Negative for blurred vision, double vision and photophobia.   Respiratory:  Positive for cough. Negative for hemoptysis and sputum production.    Cardiovascular:  Negative for chest pain, palpitations and orthopnea.   Gastrointestinal:  Negative for heartburn, nausea and vomiting.   Genitourinary:  Negative for dysuria, flank pain, frequency and hematuria.   Musculoskeletal:  Positive for myalgias. Negative for back pain, joint pain and neck pain.   Skin: "  Negative for itching and rash.   Neurological:  Negative for tremors, speech change, focal weakness and headaches.   Endo/Heme/Allergies:  Negative for environmental allergies and polydipsia. Does not bruise/bleed easily.   Psychiatric/Behavioral:  Negative for hallucinations and substance abuse. The patient is not nervous/anxious.        Past Medical History   has a past medical history of Cancer (HCC).    Surgical History   has no past surgical history on file.     Family History  family history is not on file.   Family history reviewed with patient. There is no family history that is pertinent to the chief complaint.     Social History   reports that he has never smoked. He has never used smokeless tobacco. He reports that he does not currently use alcohol.    Allergies  No Known Allergies    Medications  Prior to Admission Medications   Prescriptions Last Dose Informant Patient Reported? Taking?   alfuzosin (UROXATRAL) 10 MG SR tablet   Yes Yes   Sig: Take 10 mg by mouth every day.   atorvastatin (LIPITOR) 20 MG Tab 1/1/2024 at 1800  Yes No   Sig: Take 20 mg by mouth at bedtime.   clopidogrel (PLAVIX) 75 MG Tab 1/1/2024 at 0900  Yes No   Sig: Take 75 mg by mouth every morning.   finasteride (PROSCAR) 5 MG Tab   Yes Yes   Sig: Take 5 mg by mouth every day.   meloxicam (MOBIC) 15 MG tablet 1/1/2024 at 1800  Yes No   Sig: Take 15 mg by mouth at bedtime.   metformin (GLUCOPHAGE) 1000 MG tablet 1/2/2024 at 0900  Yes No   Sig: Take 1,000 mg by mouth 2 times a day with meals.   metoprolol tartrate (LOPRESSOR) 25 MG Tab 1/2/2024 at 0900  Yes No   Sig: Take 25 mg by mouth 2 times a day.   potassium citrate SR (UROCIT-K SR) 10 MEQ (1080 MG) Tab CR 1/2/2024 at 0900  Yes No   Sig: Take 10 mEq by mouth 2 times a day.      Facility-Administered Medications: None       Physical Exam  Temp:  [36.2 °C (97.1 °F)] 36.2 °C (97.1 °F)  Pulse:  [54-65] 56  Resp:  [16] 16  BP: (112-142)/(56-63) 124/56  SpO2:  [96 %-97 %] 96 %  Blood  "Pressure : 124/56   Temperature: 36.2 °C (97.1 °F)   Pulse: (!) 56   Respiration: 16   Pulse Oximetry: 96 %       Physical Exam  Vitals and nursing note reviewed.   Constitutional:       General: He is not in acute distress.     Appearance: Normal appearance.   HENT:      Head: Normocephalic and atraumatic.      Nose: Nose normal.      Mouth/Throat:      Mouth: Mucous membranes are moist.   Eyes:      Extraocular Movements: Extraocular movements intact.      Pupils: Pupils are equal, round, and reactive to light.   Cardiovascular:      Rate and Rhythm: Normal rate and regular rhythm.   Pulmonary:      Effort: Pulmonary effort is normal.      Breath sounds: Normal breath sounds.   Abdominal:      General: Abdomen is flat. There is no distension.      Tenderness: There is no abdominal tenderness.   Musculoskeletal:         General: No swelling or deformity. Normal range of motion.      Cervical back: Normal range of motion and neck supple.   Skin:     General: Skin is warm and dry.   Neurological:      General: No focal deficit present.      Mental Status: He is alert and oriented to person, place, and time.   Psychiatric:         Mood and Affect: Mood normal.         Behavior: Behavior normal.         Laboratory:  Recent Labs     01/02/24  0904 01/02/24  1832   WBC 1.3* 1.4*   RBC 3.71* 3.47*   HEMOGLOBIN 12.3* 11.3*   HEMATOCRIT 36.7* 33.6*   MCV 98.9* 96.8   MCH 33.2* 32.6   MCHC 33.5 33.6   RDW 93.2* 92.2*   PLATELETCT 78* 82*     Recent Labs     01/02/24  0904 01/02/24  1832   SODIUM 141 141   POTASSIUM 4.5 4.8   CHLORIDE 105 106   CO2 23 23   GLUCOSE 141* 109*   BUN 20 21   CREATININE 0.97 0.89   CALCIUM 8.9 8.7     Recent Labs     01/02/24  0904 01/02/24  1832   ALTSGPT 24 21   ASTSGOT 18 16   ALKPHOSPHAT 97 90   TBILIRUBIN 0.9 0.7   GLUCOSE 141* 109*     Recent Labs     01/02/24  1832   APTT >240.0*   INR 1.07     No results for input(s): \"NTPROBNP\" in the last 72 hours.  Recent Labs     01/02/24  0904 " "  TRIGLYCERIDE 72   HDL 21*   LDL 22     No results for input(s): \"TROPONINT\" in the last 72 hours.    Imaging:  DX-CHEST-PORTABLE (1 VIEW)   Final Result      No acute cardiac or pulmonary abnormalities are identified.          X-Ray:  I have personally reviewed the images and compared with prior images.    Assessment/Plan:  Justification for Admission Status  I anticipate this patient will require at least two midnights for appropriate medical management, necessitating inpatient admission because pancytopenia    Patient will need a Med/Surg bed on MEDICAL service .  The need is secondary to pancytopenia.    * Neutropenia (HCC)- (present on admission)  Assessment & Plan  ANC 0.39.  Afebrile  We will plan to consult with hematology.  Patient may have been established with [facility].  Monitor for fever  Protective isolation    Pancytopenia (HCC)  Assessment & Plan  Per history, probably secondary to underlying hematologic disorder  Role of COVID infection is unclear  Will plan to consult hematology for recommendations      COVID-19  Assessment & Plan  He reported 3 days of dry cough, chills, body aches  Currently afebrile, on room air  Chest x-ray negative for consolidation  Monitor for fever  Droplet, contact, eye protection    Elevated partial thromboplastin time (PTT)  Assessment & Plan  Etiology unclear    CAD (coronary artery disease)  Assessment & Plan  Continue Plavix, metoprolol, Lipitor        VTE prophylaxis: enoxaparin ppx  "

## 2024-01-03 NOTE — ED NOTES
Lab called with a critical WBC of 1.4, and a critical ANC of 0.39. Results read back to . ERP notified.

## 2024-01-03 NOTE — ED NOTES
Bedside report received from Jhonathan HATCH. Pt on cardiac monitor, pulse ox, and automatic BP. Fall precautions in place. Call light within reach. No supplemental O2 use at this time. Pt denies any needs at this time. Pt instructed to use urinal for urine sample; verbalized understanding

## 2024-01-03 NOTE — ASSESSMENT & PLAN NOTE
ANC 0.39.  Afebrile  We will plan to consult with hematology.  Patient may have been established with [facility].  Monitor for fever  Protective isolation

## 2024-01-03 NOTE — ED TRIAGE NOTES
"Chief Complaint   Patient presents with    Sent by MD    Abnormal Labs     Reports low WBC. 1.3.   Reports hx of CA. Including bone marrow cancer. No current chemo or radiation.     Been sick since Saturday with cold s/sx. Cough, chills, generalized weakness and body aches.      Pt ambulatory to triage for above.   Placed in chair near family room to wait for room.     /59   Pulse 65   Temp 36.2 °C (97.1 °F) (Temporal)   Resp 16   Ht 1.778 m (5' 10\")   Wt 67 kg (147 lb 11.3 oz)   SpO2 96%   BMI 21.19 kg/m²     "

## 2024-01-03 NOTE — PROGRESS NOTES
..4 Eyes Skin Assessment Completed by Genie RN and HOLDEN Bass.    Head WDL  Ears Redness and Blanching  Nose WDL  Mouth WDL  Neck WDL  Breast/Chest Scar midline scar from CABG  Shoulder Blades WDL  Spine WDL  (R) Arm/Elbow/Hand WDL  (L) Arm/Elbow/Hand WDL  Abdomen WDL  Groin WDL  Scrotum/Coccyx/Buttocks WDL  (R) Leg Scab  (L) Leg WDL  (R) Heel/Foot/Toe WDL  (L) Heel/Foot/Toe WDL          Devices In Places N/A      Interventions In Place N/A    Possible Skin Injury No    Pictures Uploaded Into Epic N/A  Wound Consult Placed N/A  RN Wound Prevention Protocol Ordered No

## 2024-01-03 NOTE — ED NOTES
Med rec complete per patient  Allergies reviewed.   Patient denies any outpatient antibiotics in the last 30 days.   Anticoagulants taken in the last 14 days? No     Patients preferred [facility]: [facility]

## 2024-01-03 NOTE — PROGRESS NOTES
Hematology/Oncology Consultation    Date of consultation: 1/3/2024 3:40 PM  Primary Hematologist/Oncologist: Ernestine    Reason for consultation: Neutropenia in the context of MDS/MF and recent COVID diagnosis.    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  Diagnosis confirmed at [facility] and subsequently underwent open heart surgery with heparin with antiten A level monitoring without issue.    5/20/2022: PSA 5.17 with diagnosis of prostate adenocarcinoma.  No treatment at this time but underwent PSA monitoring.    6/7/2022: Found to have thrombocytopenia with platelet count of 115,000.  Further workup demonstrated persistent left shifted white cells and peripheral smear demonstrating teardrops anisocytosis, poikilocytosis, and hypochromasia.    8/3/2022: Bone marrow biopsy and organ with a myeloproliferative neoplasm with 95% cellularity and 2+ fibrosis.  Trilineage atypia.  JAK2 positivity and 10% of cells. DNMT3A, TET2, and SF3B1 mutations discovered. MIPSS-70+ score of 2 (low risk disease).    4/5/2023: Establish with cancer care specialists after moving to [place].  Underwent repeat bone marrow biopsy which demonstrated no changes from 8/2022.    8/29/2023: Underwent EBRT to the prostate with purposeful avoidance of the bone marrow.    10/11/2023: Most recent follow-up with Dr. Sinha with cell counts demonstrating hemoglobin 12, WBC 4.8, platelet count 104.  WBC has fluctuated between 2.5 and 6 over the previous 6 months.      PMH:    Past Medical History:   Diagnosis Date    Cancer (HCC)     prostate, bone marrow, prostate and skin       PSH:    History reviewed. No pertinent surgical history.    Allergies:    Patient has no known allergies.    Medications:    Current Facility-Administered Medications   Medication Dose Route Frequency Provider Last Rate Last Admin    senna-docusate (Pericolace Or Senokot S) 8.6-50 MG per  tablet 2 Tablet  2 Tablet Oral BID Jean Pierre Rodgers M.D.        And    polyethylene glycol/lytes (Miralax) Packet 1 Packet  1 Packet Oral QDAY PRN Jean Pierre Rodgers M.D.        And    magnesium hydroxide (Milk Of Magnesia) suspension 30 mL  30 mL Oral QDAY PRN Jean Pierre Rodgers M.D.        And    bisacodyl (Dulcolax) suppository 10 mg  10 mg Rectal QDAY PRN Jean Pierre Rodgers M.D.        enoxaparin (Lovenox) inj 40 mg  40 mg Subcutaneous DAILY AT 1800 Jean Pierre Rodgers M.D.        acetaminophen (Tylenol) tablet 650 mg  650 mg Oral Q6HRS PRN Jean Pierre Rodgers M.D.        Pharmacy Consult Request ...Pain Management Review 1 Each  1 Each Other PHARMACY TO DOSE Jean Pierre Rodgers M.D.        oxyCODONE immediate-release (Roxicodone) tablet 2.5 mg  2.5 mg Oral Q3HRS PRN Jean Pierre Rodgers M.D.        Or    oxyCODONE immediate-release (Roxicodone) tablet 5 mg  5 mg Oral Q3HRS PRN Jean Pierre Rodgers M.D.        Or    HYDROmorphone (Dilaudid) injection 0.25 mg  0.25 mg Intravenous Q3HRS PRN Jean Pierre Rodgers M.D.        labetalol (Normodyne/Trandate) injection 10 mg  10 mg Intravenous Q4HRS PRN Jean Pierre Rodgers M.D.        ondansetron (Zofran) syringe/vial injection 4 mg  4 mg Intravenous Q4HRS PRN Jean Pierre Rodgers M.D.        ondansetron (Zofran ODT) dispertab 4 mg  4 mg Oral Q4HRS PRN Jean Pierre Rodgers M.D.        tamsulosin (Flomax) capsule 0.4 mg  0.4 mg Oral DAILY Jean Pierre Rodgers M.D.   0.4 mg at 01/03/24 0526    atorvastatin (Lipitor) tablet 20 mg  20 mg Oral QHS Jean Pierre Rodgers M.D.   20 mg at 01/02/24 2053    clopidogrel (Plavix) tablet 75 mg  75 mg Oral QAM Jean Pierre Rodgers M.D.   75 mg at 01/03/24 0527    finasteride (Proscar) tablet 5 mg  5 mg Oral DAILY Jean Pierre Rodgers M.D.   5 mg at 01/03/24 0527    metoprolol tartrate (Lopressor) tablet 25 mg  25 mg Oral BID Jean Pierre Rodgers M.D.   25 mg at 01/03/24 0527    meloxicam (Mobic) tablet 15 mg  15 mg Oral QHS Jean Pierre Rodgers M.D.   15 mg at 01/02/24 2206  "      Social History:     Social History     Socioeconomic History    Marital status: Legally      Spouse name: Not on file    Number of children: Not on file    Years of education: Not on file    Highest education level: Not on file   Occupational History    Not on file   Tobacco Use    Smoking status: Never    Smokeless tobacco: Never   Vaping Use    Vaping Use: Not on file   Substance and Sexual Activity    Alcohol use: Not Currently    Drug use: Not on file    Sexual activity: Not on file   Other Topics Concern    Not on file   Social History Narrative    Not on file     Social Determinants of Health     Financial Resource Strain: Not on file   Food Insecurity: Not on file   Transportation Needs: Not on file   Physical Activity: Not on file   Stress: Not on file   Social Connections: Not on file   Intimate Partner Violence: Not on file   Housing Stability: Not on file       Family History:     No family history on file.    Review of Systems:  Review of Systems   Constitutional:  Positive for chills, fever and malaise/fatigue. Negative for diaphoresis and weight loss.   HENT: Negative.     Eyes: Negative.    Respiratory: Negative.  Negative for cough.    Cardiovascular: Negative.  Negative for chest pain, palpitations, orthopnea, leg swelling and PND.   Gastrointestinal: Negative.  Negative for abdominal pain, blood in stool, constipation, diarrhea, melena, nausea and vomiting.   Genitourinary: Negative.  Negative for dysuria, frequency, hematuria and urgency.   Musculoskeletal: Negative.  Negative for myalgias.   Skin: Negative.    Neurological: Negative.  Negative for dizziness, focal weakness and headaches.   Endo/Heme/Allergies: Negative.  Does not bruise/bleed easily.   Psychiatric/Behavioral: Negative.  Negative for depression and memory loss. The patient does not have insomnia.         Vitals:     /57   Pulse 62   Temp 36.7 °C (98.1 °F) (Temporal)   Resp 16   Ht 1.778 m (5' 10\")   Wt " 69.2 kg (152 lb 8.9 oz)   SpO2 97%   BMI 21.89 kg/m²     Physical Exam:  Physical Exam  Vitals and nursing note reviewed.   Constitutional:       General: He is not in acute distress.     Appearance: He is not toxic-appearing.   HENT:      Head: Normocephalic and atraumatic.   Eyes:      General: No scleral icterus.     Pupils: Pupils are equal, round, and reactive to light.   Cardiovascular:      Rate and Rhythm: Normal rate and regular rhythm.      Pulses: Normal pulses.      Heart sounds: No murmur heard.     No friction rub. No gallop.   Pulmonary:      Effort: Pulmonary effort is normal.      Breath sounds: No stridor. No wheezing, rhonchi or rales.   Abdominal:      General: Abdomen is flat. Bowel sounds are normal.      Palpations: Abdomen is soft.   Musculoskeletal:         General: No swelling.      Cervical back: No rigidity.   Skin:     General: Skin is warm and dry.      Capillary Refill: Capillary refill takes 2 to 3 seconds.      Coloration: Skin is not jaundiced.   Neurological:      General: No focal deficit present.      Mental Status: He is alert and oriented to person, place, and time. Mental status is at baseline.   Psychiatric:         Mood and Affect: Mood normal.          Pertinent Labs:     Bone marrow biopsy 4/5/2023:  Hypercellular bone marrow 90-95% with mild dysmorphia and progressive trilineage hematopoiesis.  Reticulin fibrosis to out of 3.  Ring sideroblasts present.  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    Flow cytometry demonstrates mild immunophenotypic abnormalities among maturing granulocytes.  JAK2 mutation present in approximately 10% of the cells.  Myeloid [employer] ration sequencing demonstrated DNMT3A, SF3B1, and TET2 mutations.     Assessment and Plan:    # Myelodysplastic Syndrome/Myelfibrosis Overlap    # COVID-19  # Infectious Myelosuppression  # Neutropenia  The patient was initially diagnosed with myelodysplastic  syndrome/myelofibrosis overlap in 2022 after he presented with isolated thrombocytopenia associated with bizarre RBC morphology.  Bone marrow biopsy at that time demonstrated trilineage dysplasia with mutations in JAK2, DMNT3A, TET2, and SF3B1.  Ring sideroblasts were also present on the bone marrow biopsy.  This consolation of findings is demonstrative of both myelofibrosis and myelodysplastic syndrome.  MIPSS risk scoring system for MDS demonstrated low risk disease.  His CBC has been notable for a WBC of 2.5-6 over the last 6 months, always with a normal neutrophil count.    His neutropenia is likely secondary to infectious myelosuppression from COVID-19.  While lymphopenia is the most common white blood cell abnormality that we see in patients with COVID infection, neutropenia has also been described in individuals who have underlying hematologic malignancies.  As his counts have been stable in the recent past, we recommend that his neutropenia simply be observed and rechecked with an outpatient CBC in several weeks.  He already has follow-up scheduled with Dr. Sinha on 2/13/2024 with a CBC and CMP scheduled beforehand.  If his neutropenia persists at that time (which should be ample time for his bone marrow to recover), we will proceed with an outpatient bone marrow biopsy.    We discussed that his neutropenia nonetheless increases his risk for other infections and that he should return to the emergency department for fevers greater than 100.4 °F.  However, as I anticipate this will be a short-lived course of neutropenia, I do not feel that he needs to be sent home with prophylactic antibiotics, antifungals, or antiviral agents.    # Hereditary Factor XII Deficiency  Factor XII deficiency renders the APTT uninterpretable.  Interestingly, factor XII deficiency is a laboratory finding only and causes no clinical bleeding diathesis.  In the event that anticoagulation is indicated, anti-Xa levels for heparin  should be used for anticoagulation monitoring.    PLAN:  - No active intervention for neutropenia at this time  - Follow up with Dr. Sinha scheduled for 2/13/24 (purposefully this far out) with labs beforehand  - If neutropenia persists on follow up, will arrange for outpatient bone marrow biopsy  - Neutropenic precautions and ED return criteria discussed.      Thank you for allowing me to participate in his care. Please do not hesitate to reach out with any questions.    Please note that this dictation was created using voice recognition software. I have made every reasonable attempt to correct obvious errors, but I expect that there are errors of grammar and possibly content that I did not discover before finalizing the note.      Nicholas Hernandez MD  Hematologist/Oncologist  [facility]   [facility] - [facility] [employer]

## 2024-01-03 NOTE — ASSESSMENT & PLAN NOTE
Per history, probably secondary to underlying hematologic disorder  Role of COVID infection is unclear  Will plan to consult hematology for recommendations

## 2024-01-03 NOTE — ASSESSMENT & PLAN NOTE
He reported 3 days of dry cough, chills, body aches  Currently afebrile, on room air  Chest x-ray negative for consolidation  Monitor for fever  Droplet, contact, eye protection

## 2024-01-03 NOTE — ED NOTES
Pt medicated per MAR. Pt resting, NAD noted. Pt denies any needs at this time. Call light within reach

## 2024-01-03 NOTE — ED PROVIDER NOTES
"ED Provider Note    CHIEF COMPLAINT  Chief Complaint   Patient presents with    Sent by MD    Abnormal Labs     Reports low WBC. 1.3.   Reports hx of CA. Including bone marrow cancer. No current chemo or radiation.     Been sick since Saturday with cold s/sx. Cough, chills, generalized weakness and body aches.        EXTERNAL RECORDS REVIEWED  Outpatient Notes previous ER visits    HPI/ROS  LIMITATION TO HISTORY   Select: : None    Abebe Yin is a 68 y.o. male who presents for evaluation of abnormal labs.  The patient was sent for routine yearly labs today.  His primary care physician was notified of significant leukopenia and the patient was directed to the ED for evaluation.  The patient relates past history of some type of \"bone cancer\".  He cannot recall the specific diagnoses that he was given or the name of the oncologist who took care of him.  He states that he was told that his only option was a bone marrow transplant which she opted against.  Patient states over the last 4 days he has had fever chills and a dry nonproductive cough.  He denies any associated: Abdominal pain, vomiting, genitourinary symptoms, rashes.  No other complaints.    PAST MEDICAL HISTORY   has a past medical history of Cancer (HCC).  Coronary artery disease  Prostate cancer  Diabetes mellitus    SURGICAL HISTORY  patient denies any surgical history    FAMILY HISTORY  No family history on file.    SOCIAL HISTORY  Social History     Tobacco Use    Smoking status: Never    Smokeless tobacco: Never   Vaping Use    Vaping Use: Not on file   Substance and Sexual Activity    Alcohol use: Not Currently    Drug use: Not on file    Sexual activity: Not on file       CURRENT MEDICATIONS  Home Medications       Reviewed by Teddy Maya R.N. (Registered Nurse) on 01/02/24 at 1658  Med List Status: Partial     Medication Last Dose Status   atorvastatin (LIPITOR) 20 MG Tab  Active   clopidogrel (PLAVIX) 75 MG Tab  Active   meloxicam " "(MOBIC) 15 MG tablet  Active   metformin (GLUCOPHAGE) 1000 MG tablet  Active   metoprolol tartrate (LOPRESSOR) 25 MG Tab  Active   potassium citrate SR (UROCIT-K SR) 10 MEQ (1080 MG) Tab CR  Active                    ALLERGIES  No Known Allergies    PHYSICAL EXAM  VITAL SIGNS: /55   Pulse (!) 54   Temp 36.2 °C (97.1 °F) (Temporal)   Resp 20   Ht 1.778 m (5' 10\")   Wt 67 kg (147 lb 11.3 oz)   SpO2 95%   BMI 21.19 kg/m²    Constitutional: 68-year-old male, weak appearing, awake, oriented x 3  HENT: Normocephalic, Atraumatic, Nares:Clear, Oropharynx: moist, well hydrated, posterior pharynx:clear   Eyes: PERRL, EOMI, Conjunctiva normal, No discharge.   Neck: Normal range of motion, No tenderness, Supple, No stridor.   Lymphatic: No lymphadenopathy noted.   Cardiovascular: Regular rate and rhythm without mumurs, gallups, rubs   Thorax & Lungs: Normal Equal breath sounds, No respiratory distress, No wheezing, no stridor, no rales. No chest tenderness.   Abdomen: Soft, nontender, nondistended, no organomegaly, positive bowel sounds normal in quality. No guarding or rebound.  Skin: Good skin turgor, pink, warm, dry. No rashes, petechiae, purpura. Normal capillary refill.   Back: No tenderness, No CVA tenderness.   Extremities: Intact distal pulses, No edema, No tenderness, No cyanosis,  Vascular: Pulses are 2+, symmetric in the upper and lower extremities.  Musculoskeletal: Good range of motion in all major joints. No tenderness to palpation or major deformities noted.   Neurologic: Alert & oriented x 3,  No gross focal deficits noted.   Psychiatric: Affect normal, Judgment normal, Mood normal.     DIAGNOSTIC STUDIES / PROCEDURES  EKG  I have independently interpreted this EKG  Rate 60, rhythm sinus, left axis deviation, NJ QRS QT intervals normal, no acute STEMI, twelve-lead EKG, no old tracing for comparison;    LABS  CBC shows a white count of 1.4, 23% neutrophils, 42% lymphocytes, 21% monocytes, hemoglobin " 11.3, crit 33.6; ANC of 300; sed rate 22; CMP shows a glucose of 109, otherwise within normal limits; CPK 41; lactic acid 1.9; INR 1.07; PTT greater than 240 repeated and was still greater than 240; urinalysis negative; procalcitonin less than 0.05; influenza and RSV is negative; COVID-positive;    RADIOLOGY    Radiologist interpretation:   DX-CHEST-PORTABLE (1 VIEW)   Final Result      No acute cardiac or pulmonary abnormalities are identified.            COURSE & MEDICAL DECISION MAKING    1.  Monitor  2.  IV fluids per sepsis protocol    INITIAL ASSESSMENT, COURSE AND PLAN  Care Narrative: At this time, the patient presents for evaluation of neutropenia.  I reviewed labs from earlier today which showed significant neutropenia.  Repeat labs were performed which confirmed this.  Patient also is positive for COVID.  The patient has significant comorbidities.  At this time we do not see focal bacterial infection at this time lactate is normal.  However the patient will need to be monitored for deterioration in his white count and monitoring for potential sepsis as he is high risk due to his significant neutropenia.  I spoke with the hospitalist on-call.  The patient will be admitted for further monitoring, treatment, and care.  I spent 35 minutes of bedside attendance evaluating the patient, reassessing the patient, reviewing laboratory and imaging studies, implementing treatment, reviewing response to treatment, speaking with consultants.    HYDRATION: Based on the patient's presentation of Dehydration and Sepsis the patient was given IV fluids. IV Hydration was used because oral hydration was not adequate alone. Upon recheck following hydration, the patient was stable.      ADDITIONAL PROBLEM LIST  1 coronary artery disease  2.  Prostate cancer    DISPOSITION AND DISCUSSIONS  I have discussed management of the patient with the following physicians and ROBERT's: Hospitalist on-call      FINAL DIAGNOSIS  1. Neutropenia,  unspecified type (HCC)    2. COVID    3.      Critical care time, 35 minutes         Electronically signed by: Guy G Gansert, M.D., 1/2/2024 5:59 PM

## 2024-01-03 NOTE — CARE PLAN
The patient is Stable - Low risk of patient condition declining or worsening    Shift Goals  Clinical Goals: monitor signs symptoms covid  Patient Goals: sleep    Progress made toward(s) clinical / shift goals:  Patient alert and oriented and resting comfortably in bed.  Patient on covid and protective neutropenic isolation.  No meds given this evening because none scheduled.  All belongings within reach.  Patient completely ambulatory.     Patient is not progressing towards the following goals:

## 2024-01-04 ENCOUNTER — TELEPHONE (OUTPATIENT)
Dept: HEALTH INFORMATION MANAGEMENT | Facility: OTHER | Age: 69
End: 2024-01-04

## 2024-01-04 NOTE — DISCHARGE SUMMARY
Discharge Summary    CHIEF COMPLAINT ON ADMISSION  Chief Complaint   Patient presents with    Sent by MD    Abnormal Labs     Reports low WBC. 1.3.   Reports hx of CA. Including bone marrow cancer. No current chemo or radiation.     Been sick since Saturday with cold s/sx. Cough, chills, generalized weakness and body aches.        Reason for Admission  Abnormal Labs     Admission Date  1/2/2024    CODE STATUS  Full Code    HPI & HOSPITAL COURSE  Abebe Yin is a 68 y.o. male with past medical history of prostate cancer, CAD, type 2 diabetes, myelofibrosis who presented 1/2/2024 with pancytopenia..  Patient had routine labs done ordered by PCP, that came back showing pancytopenia.  Therefore he was referred to ER.  Patient has been having cough, chills, body aches since Saturday.  He is afebrile, on room air.  Chest x-ray negative for consolidation.  His COVID test came back positive. Patient admitted for evaluation. , no fever here during hospitalization. Oncology consulted who suspect myelosuppression from acute COVID infection. No need for prophylacitc antibiotics or antivirals at this time. They suspect neutropenia should improve in the next couple of weeks. They recommend outpatient CBC and oncology follow up as scheduled next month. They can arrange bone marrow biopsy for patient at that time if needed. Patient is given return precautions to ED should he have any fever at home, in which case he would need neutropenic fever evaluation and management. Patient feeling well. He is to follow up with oncology next month as scheduled.    Therefore, he is discharged in fair and stable condition to home with close outpatient follow-up.    The patient met 2-midnight criteria for an inpatient stay at the time of discharge.    Discharge Date  1/3/2024    FOLLOW UP ITEMS POST DISCHARGE  Follow up with oncology next month.  Return to ED if having fever or worsening signs of infection.    DISCHARGE  DIAGNOSES  Principal Problem:    Neutropenia (HCC) (POA: Yes)  Active Problems:    Pancytopenia (HCC) (POA: Unknown)    COVID-19 (POA: Unknown)    CAD (coronary artery disease) (POA: Unknown)    Elevated partial thromboplastin time (PTT) (POA: Unknown)  Resolved Problems:    * No resolved hospital problems. *      FOLLOW UP  No future appointments.  Keegan Sinha M.D.  [address]  [facility] [address]  [phone]    Follow up in 1 month(s)        MEDICATIONS ON DISCHARGE     Medication List        CONTINUE taking these medications        Instructions   alfuzosin 10 MG SR tablet  Commonly known as: Uroxatral   Take 10 mg by mouth every day.  Dose: 10 mg     atorvastatin 20 MG Tabs  Commonly known as: Lipitor   Take 20 mg by mouth at bedtime.  Dose: 20 mg     clopidogrel 75 MG Tabs  Commonly known as: Plavix   Take 75 mg by mouth every morning.  Dose: 75 mg     finasteride 5 MG Tabs  Commonly known as: Proscar   Take 5 mg by mouth every day.  Dose: 5 mg     meloxicam 15 MG tablet  Commonly known as: Mobic   Take 15 mg by mouth at bedtime.  Dose: 15 mg     metformin 1000 MG tablet  Commonly known as: Glucophage   Take 1,000 mg by mouth 2 times a day with meals.  Dose: 1,000 mg     metoprolol tartrate 25 MG Tabs  Commonly known as: Lopressor   Take 25 mg by mouth 2 times a day.  Dose: 25 mg     potassium citrate SR 10 MEQ (1080 MG) Tbcr  Commonly known as: Urocit-K SR   Take 10 mEq by mouth 2 times a day.  Dose: 10 mEq              Allergies  No Known Allergies    DIET  Orders Placed This Encounter   Procedures    Diet Order Diet: Regular     Standing Status:   Standing     Number of Occurrences:   1     Order Specific Question:   Diet:     Answer:   Regular [1]       ACTIVITY  As tolerated.  Weight bearing as tolerated    CONSULTATIONS  Oncology/hematology    PROCEDURES  none    LABORATORY  Lab Results   Component Value Date    SODIUM 142 01/03/2024    POTASSIUM 3.9 01/03/2024    CHLORIDE 107  01/03/2024    CO2 25 01/03/2024    GLUCOSE 117 (H) 01/03/2024    BUN 20 01/03/2024    CREATININE 0.75 01/03/2024        Lab Results   Component Value Date    WBC 1.5 (LL) 01/03/2024    HEMOGLOBIN 10.6 (L) 01/03/2024    HEMATOCRIT 31.3 (L) 01/03/2024    PLATELETCT 80 (L) 01/03/2024        Total time of the discharge process exceeds 35 minutes.

## 2024-01-04 NOTE — PROGRESS NOTES
Patient discharged. Patient stated he understood discharge information. IV removed. Patient escorted to parking lot     Emily Hdez

## 2024-01-04 NOTE — CARE PLAN
The patient is Stable - Low risk of patient condition declining or worsening    Shift Goals  Clinical Goals: monitor signs symptoms covid  Patient Goals: sleep    Progress made toward(s) clinical / shift goals:      Problem: Pain - Standard  Goal: Alleviation of pain or a reduction in pain to the patient’s comfort goal  Outcome: Met     Problem: Knowledge Deficit - Standard  Goal: Patient and family/care givers will demonstrate understanding of plan of care, disease process/condition, diagnostic tests and medications  Outcome: Met       Patient is not progressing towards the following goals:

## 2024-01-07 LAB
BACTERIA BLD CULT: NORMAL
SIGNIFICANT IND 70042: NORMAL
SITE SITE: NORMAL
SOURCE SOURCE: NORMAL

## 2024-01-10 ENCOUNTER — HOSPITAL ENCOUNTER (OUTPATIENT)
Dept: LAB | Facility: MEDICAL CENTER | Age: 69
End: 2024-01-10
Attending: NURSE PRACTITIONER
Payer: MEDICARE

## 2024-01-10 LAB
ALBUMIN SERPL BCP-MCNC: 4.7 G/DL (ref 3.2–4.9)
ALBUMIN/GLOB SERPL: 1.7 G/DL
ALP SERPL-CCNC: 89 U/L (ref 30–99)
ALT SERPL-CCNC: 13 U/L (ref 2–50)
ANION GAP SERPL CALC-SCNC: 10 MMOL/L (ref 7–16)
ANISOCYTOSIS BLD QL SMEAR: ABNORMAL
AST SERPL-CCNC: 12 U/L (ref 12–45)
BASOPHILS # BLD AUTO: 0.2 % (ref 0–1.8)
BASOPHILS # BLD: 0.01 K/UL (ref 0–0.12)
BILIRUB SERPL-MCNC: 1 MG/DL (ref 0.1–1.5)
BUN SERPL-MCNC: 18 MG/DL (ref 8–22)
CALCIUM ALBUM COR SERPL-MCNC: 8.4 MG/DL (ref 8.5–10.5)
CALCIUM SERPL-MCNC: 9 MG/DL (ref 8.4–10.2)
CHLORIDE SERPL-SCNC: 106 MMOL/L (ref 96–112)
CO2 SERPL-SCNC: 24 MMOL/L (ref 20–33)
CREAT SERPL-MCNC: 0.84 MG/DL (ref 0.5–1.4)
DACRYOCYTES BLD QL SMEAR: NORMAL
EOSINOPHIL # BLD AUTO: 0.03 K/UL (ref 0–0.51)
EOSINOPHIL NFR BLD: 0.7 % (ref 0–6.9)
ERYTHROCYTE [DISTWIDTH] IN BLOOD BY AUTOMATED COUNT: 89.7 FL (ref 35.9–50)
FASTING STATUS PATIENT QL REPORTED: NORMAL
GFR SERPLBLD CREATININE-BSD FMLA CKD-EPI: 96 ML/MIN/1.73 M 2
GLOBULIN SER CALC-MCNC: 2.8 G/DL (ref 1.9–3.5)
GLUCOSE SERPL-MCNC: 129 MG/DL (ref 65–99)
HCT VFR BLD AUTO: 34.5 % (ref 42–52)
HGB BLD-MCNC: 11.8 G/DL (ref 14–18)
IMM GRANULOCYTES # BLD AUTO: 0.16 K/UL (ref 0–0.11)
IMM GRANULOCYTES NFR BLD AUTO: 3.8 % (ref 0–0.9)
LYMPHOCYTES # BLD AUTO: 1.02 K/UL (ref 1–4.8)
LYMPHOCYTES NFR BLD: 24.2 % (ref 22–41)
MACROCYTES BLD QL SMEAR: ABNORMAL
MCH RBC QN AUTO: 33.5 PG (ref 27–33)
MCHC RBC AUTO-ENTMCNC: 34.2 G/DL (ref 32.3–36.5)
MCV RBC AUTO: 98 FL (ref 81.4–97.8)
MONOCYTES # BLD AUTO: 0.69 K/UL (ref 0–0.85)
MONOCYTES NFR BLD AUTO: 16.4 % (ref 0–13.4)
NEUTROPHILS # BLD AUTO: 2.31 K/UL (ref 1.82–7.42)
NEUTROPHILS NFR BLD: 54.7 % (ref 44–72)
NRBC # BLD AUTO: 0.03 K/UL
NRBC BLD-RTO: 0.7 /100 WBC (ref 0–0.2)
OVALOCYTES BLD QL SMEAR: NORMAL
PLATELET # BLD AUTO: 135 K/UL (ref 164–446)
PLATELET BLD QL SMEAR: NORMAL
PMV BLD AUTO: 10.2 FL (ref 9–12.9)
POIKILOCYTOSIS BLD QL SMEAR: NORMAL
POTASSIUM SERPL-SCNC: 4.3 MMOL/L (ref 3.6–5.5)
PROT SERPL-MCNC: 7.5 G/DL (ref 6–8.2)
RBC # BLD AUTO: 3.52 M/UL (ref 4.7–6.1)
RBC BLD AUTO: PRESENT
SCHISTOCYTES BLD QL SMEAR: NORMAL
SODIUM SERPL-SCNC: 140 MMOL/L (ref 135–145)
WBC # BLD AUTO: 4.2 K/UL (ref 4.8–10.8)

## 2024-01-10 PROCEDURE — 85025 COMPLETE CBC W/AUTO DIFF WBC: CPT

## 2024-01-10 PROCEDURE — 36415 COLL VENOUS BLD VENIPUNCTURE: CPT

## 2024-01-10 PROCEDURE — 80053 COMPREHEN METABOLIC PANEL: CPT

## 2024-01-14 LAB
BACTERIA BLD CULT: NORMAL
SIGNIFICANT IND 70042: NORMAL
SITE SITE: NORMAL
SOURCE SOURCE: NORMAL

## 2024-02-06 ENCOUNTER — HOSPITAL ENCOUNTER (OUTPATIENT)
Dept: LAB | Facility: MEDICAL CENTER | Age: 69
End: 2024-02-06
Attending: INTERNAL MEDICINE
Payer: MEDICARE

## 2024-02-06 LAB
ALBUMIN SERPL BCP-MCNC: 4.7 G/DL (ref 3.2–4.9)
ALBUMIN/GLOB SERPL: 1.7 G/DL
ALP SERPL-CCNC: 94 U/L (ref 30–99)
ALT SERPL-CCNC: 17 U/L (ref 2–50)
ANION GAP SERPL CALC-SCNC: 11 MMOL/L (ref 7–16)
AST SERPL-CCNC: 11 U/L (ref 12–45)
BASOPHILS # BLD AUTO: 0.6 % (ref 0–1.8)
BASOPHILS # BLD: 0.02 K/UL (ref 0–0.12)
BILIRUB SERPL-MCNC: 1.1 MG/DL (ref 0.1–1.5)
BUN SERPL-MCNC: 18 MG/DL (ref 8–22)
CALCIUM ALBUM COR SERPL-MCNC: 8.7 MG/DL (ref 8.5–10.5)
CALCIUM SERPL-MCNC: 9.3 MG/DL (ref 8.4–10.2)
CHLORIDE SERPL-SCNC: 104 MMOL/L (ref 96–112)
CO2 SERPL-SCNC: 24 MMOL/L (ref 20–33)
CREAT SERPL-MCNC: 0.88 MG/DL (ref 0.5–1.4)
EOSINOPHIL # BLD AUTO: 0.04 K/UL (ref 0–0.51)
EOSINOPHIL NFR BLD: 1.1 % (ref 0–6.9)
ERYTHROCYTE [DISTWIDTH] IN BLOOD BY AUTOMATED COUNT: 93.9 FL (ref 35.9–50)
FASTING STATUS PATIENT QL REPORTED: NORMAL
GFR SERPLBLD CREATININE-BSD FMLA CKD-EPI: 94 ML/MIN/1.73 M 2
GLOBULIN SER CALC-MCNC: 2.7 G/DL (ref 1.9–3.5)
GLUCOSE SERPL-MCNC: 130 MG/DL (ref 65–99)
HCT VFR BLD AUTO: 34.5 % (ref 42–52)
HGB BLD-MCNC: 11.8 G/DL (ref 14–18)
IMM GRANULOCYTES # BLD AUTO: 0.09 K/UL (ref 0–0.11)
IMM GRANULOCYTES NFR BLD AUTO: 2.5 % (ref 0–0.9)
LDH SERPL L TO P-CCNC: 151 U/L (ref 107–266)
LYMPHOCYTES # BLD AUTO: 1.12 K/UL (ref 1–4.8)
LYMPHOCYTES NFR BLD: 30.9 % (ref 22–41)
MCH RBC QN AUTO: 33.9 PG (ref 27–33)
MCHC RBC AUTO-ENTMCNC: 34.2 G/DL (ref 32.3–36.5)
MCV RBC AUTO: 99.1 FL (ref 81.4–97.8)
MONOCYTES # BLD AUTO: 0.69 K/UL (ref 0–0.85)
MONOCYTES NFR BLD AUTO: 19.1 % (ref 0–13.4)
NEUTROPHILS # BLD AUTO: 1.66 K/UL (ref 1.82–7.42)
NEUTROPHILS NFR BLD: 45.8 % (ref 44–72)
NRBC # BLD AUTO: 0.04 K/UL
NRBC BLD-RTO: 1.1 /100 WBC (ref 0–0.2)
PLATELET # BLD AUTO: 116 K/UL (ref 164–446)
PMV BLD AUTO: 11.1 FL (ref 9–12.9)
POTASSIUM SERPL-SCNC: 4.7 MMOL/L (ref 3.6–5.5)
PROT SERPL-MCNC: 7.4 G/DL (ref 6–8.2)
RBC # BLD AUTO: 3.48 M/UL (ref 4.7–6.1)
SODIUM SERPL-SCNC: 139 MMOL/L (ref 135–145)
WBC # BLD AUTO: 3.6 K/UL (ref 4.8–10.8)

## 2024-02-06 PROCEDURE — 36415 COLL VENOUS BLD VENIPUNCTURE: CPT

## 2024-02-06 PROCEDURE — 83615 LACTATE (LD) (LDH) ENZYME: CPT

## 2024-02-06 PROCEDURE — 85025 COMPLETE CBC W/AUTO DIFF WBC: CPT

## 2024-02-06 PROCEDURE — 80053 COMPREHEN METABOLIC PANEL: CPT

## 2024-02-14 ENCOUNTER — APPOINTMENT (OUTPATIENT)
Dept: RADIOLOGY | Facility: MEDICAL CENTER | Age: 69
End: 2024-02-14
Attending: EMERGENCY MEDICINE
Payer: MEDICARE

## 2024-02-14 ENCOUNTER — HOSPITAL ENCOUNTER (EMERGENCY)
Facility: MEDICAL CENTER | Age: 69
End: 2024-02-14
Attending: EMERGENCY MEDICINE
Payer: MEDICARE

## 2024-02-14 VITALS
DIASTOLIC BLOOD PRESSURE: 65 MMHG | HEART RATE: 59 BPM | OXYGEN SATURATION: 96 % | HEIGHT: 70 IN | BODY MASS INDEX: 21.78 KG/M2 | WEIGHT: 152.12 LBS | RESPIRATION RATE: 16 BRPM | SYSTOLIC BLOOD PRESSURE: 121 MMHG | TEMPERATURE: 96.6 F

## 2024-02-14 DIAGNOSIS — M79.602 LEFT ARM PAIN: ICD-10-CM

## 2024-02-14 LAB
ALBUMIN SERPL BCP-MCNC: 4.7 G/DL (ref 3.2–4.9)
ALBUMIN/GLOB SERPL: 1.7 G/DL
ALP SERPL-CCNC: 88 U/L (ref 30–99)
ALT SERPL-CCNC: 18 U/L (ref 2–50)
ANION GAP SERPL CALC-SCNC: 11 MMOL/L (ref 7–16)
ANISOCYTOSIS BLD QL SMEAR: ABNORMAL
APTT PPP: 207.2 SEC (ref 24.7–36)
AST SERPL-CCNC: 14 U/L (ref 12–45)
BASOPHILS # BLD AUTO: 0 % (ref 0–1.8)
BASOPHILS # BLD: 0 K/UL (ref 0–0.12)
BILIRUB SERPL-MCNC: 1.4 MG/DL (ref 0.1–1.5)
BUN SERPL-MCNC: 18 MG/DL (ref 8–22)
CALCIUM ALBUM COR SERPL-MCNC: 8.7 MG/DL (ref 8.5–10.5)
CALCIUM SERPL-MCNC: 9.3 MG/DL (ref 8.4–10.2)
CHLORIDE SERPL-SCNC: 104 MMOL/L (ref 96–112)
CO2 SERPL-SCNC: 26 MMOL/L (ref 20–33)
CREAT SERPL-MCNC: 0.89 MG/DL (ref 0.5–1.4)
D DIMER PPP IA.FEU-MCNC: 0.42 UG/ML (FEU) (ref 0–0.5)
DACRYOCYTES BLD QL SMEAR: NORMAL
EKG IMPRESSION: NORMAL
EOSINOPHIL # BLD AUTO: 0.03 K/UL (ref 0–0.51)
EOSINOPHIL NFR BLD: 1 % (ref 0–6.9)
ERYTHROCYTE [DISTWIDTH] IN BLOOD BY AUTOMATED COUNT: 95 FL (ref 35.9–50)
GFR SERPLBLD CREATININE-BSD FMLA CKD-EPI: 94 ML/MIN/1.73 M 2
GLOBULIN SER CALC-MCNC: 2.8 G/DL (ref 1.9–3.5)
GLUCOSE SERPL-MCNC: 143 MG/DL (ref 65–99)
HCT VFR BLD AUTO: 36.4 % (ref 42–52)
HGB BLD-MCNC: 12.4 G/DL (ref 14–18)
INR PPP: 1.11 (ref 0.87–1.13)
LACTATE SERPL-SCNC: 2.5 MMOL/L (ref 0.5–2)
LYMPHOCYTES # BLD AUTO: 1.02 K/UL (ref 1–4.8)
LYMPHOCYTES NFR BLD: 32 % (ref 22–41)
MACROCYTES BLD QL SMEAR: ABNORMAL
MANUAL DIFF BLD: NORMAL
MCH RBC QN AUTO: 34 PG (ref 27–33)
MCHC RBC AUTO-ENTMCNC: 34.1 G/DL (ref 32.3–36.5)
MCV RBC AUTO: 99.7 FL (ref 81.4–97.8)
METAMYELOCYTES NFR BLD MANUAL: 1 %
MICROCYTES BLD QL SMEAR: ABNORMAL
MONOCYTES # BLD AUTO: 0.45 K/UL (ref 0–0.85)
MONOCYTES NFR BLD AUTO: 14 % (ref 0–13.4)
NEUTROPHILS # BLD AUTO: 1.66 K/UL (ref 1.82–7.42)
NEUTROPHILS NFR BLD: 48 % (ref 44–72)
NEUTS BAND NFR BLD MANUAL: 4 % (ref 0–10)
NRBC # BLD AUTO: 0.02 K/UL
NRBC BLD-RTO: 0.6 /100 WBC (ref 0–0.2)
OVALOCYTES BLD QL SMEAR: NORMAL
PLATELET # BLD AUTO: 118 K/UL (ref 164–446)
PLATELET BLD QL SMEAR: NORMAL
PMV BLD AUTO: 11.2 FL (ref 9–12.9)
POIKILOCYTOSIS BLD QL SMEAR: NORMAL
POTASSIUM SERPL-SCNC: 4.5 MMOL/L (ref 3.6–5.5)
PROT SERPL-MCNC: 7.5 G/DL (ref 6–8.2)
PROTHROMBIN TIME: 14.8 SEC (ref 12–14.6)
RBC # BLD AUTO: 3.65 M/UL (ref 4.7–6.1)
RBC BLD AUTO: PRESENT
SCHISTOCYTES BLD QL SMEAR: NORMAL
SODIUM SERPL-SCNC: 141 MMOL/L (ref 135–145)
TROPONIN T SERPL-MCNC: 12 NG/L (ref 6–19)
WBC # BLD AUTO: 3.2 K/UL (ref 4.8–10.8)

## 2024-02-14 PROCEDURE — 85379 FIBRIN DEGRADATION QUANT: CPT

## 2024-02-14 PROCEDURE — 93005 ELECTROCARDIOGRAM TRACING: CPT

## 2024-02-14 PROCEDURE — 93005 ELECTROCARDIOGRAM TRACING: CPT | Performed by: EMERGENCY MEDICINE

## 2024-02-14 PROCEDURE — 84484 ASSAY OF TROPONIN QUANT: CPT

## 2024-02-14 PROCEDURE — 700117 HCHG RX CONTRAST REV CODE 255: Performed by: EMERGENCY MEDICINE

## 2024-02-14 PROCEDURE — 85027 COMPLETE CBC AUTOMATED: CPT

## 2024-02-14 PROCEDURE — 83605 ASSAY OF LACTIC ACID: CPT

## 2024-02-14 PROCEDURE — 71275 CT ANGIOGRAPHY CHEST: CPT

## 2024-02-14 PROCEDURE — 85007 BL SMEAR W/DIFF WBC COUNT: CPT

## 2024-02-14 PROCEDURE — 80053 COMPREHEN METABOLIC PANEL: CPT

## 2024-02-14 PROCEDURE — 71045 X-RAY EXAM CHEST 1 VIEW: CPT

## 2024-02-14 PROCEDURE — 85610 PROTHROMBIN TIME: CPT

## 2024-02-14 PROCEDURE — 85730 THROMBOPLASTIN TIME PARTIAL: CPT

## 2024-02-14 PROCEDURE — 36415 COLL VENOUS BLD VENIPUNCTURE: CPT

## 2024-02-14 PROCEDURE — 99284 EMERGENCY DEPT VISIT MOD MDM: CPT

## 2024-02-14 RX ADMIN — IOHEXOL 75 ML: 350 INJECTION, SOLUTION INTRAVENOUS at 13:26

## 2024-02-14 ASSESSMENT — FIBROSIS 4 INDEX: FIB4 SCORE: 1.54

## 2024-02-14 NOTE — ED TRIAGE NOTES
"Chief Complaint   Patient presents with    Arm Pain     Pt c/o [employer] Pn [place], currently numb & tingling from elbow to all 5-digits, Pt denied having any trauma;    Rib Pain     Rt side in the back, Pt stated that he was in his truck and tried to open the passenger door hit ribs on center console, happened Thursday;       ED Triage Vitals [02/14/24 0753]   Enc Vitals Group      Blood Pressure  (Abnormal) 159/82      Pulse 65      Respiration 14      Temperature 36.7 °C (98 °F)      Temp src Temporal      Pulse Oximetry 99 %      Weight 69 kg (152 lb 1.9 oz)      Height 1.778 m (5' 10\")      Head Circumference       Peak Flow       Pain Score       Pain Loc       Pain Edu?       Excl. in GC?        "

## 2024-02-14 NOTE — ED NOTES
Med rec is complete per Patient at bedside.  Per Pt , he is on Atorvastatin 20 mg QHS. Doctor tried to change , but pt went back to 20 mg QHS. Per Pt, Metformin is BID.  Allergies reviewed.    Has patient had any outside antibiotics in the last 30 days?  n    Any Anticoagulants (rivaroxaban, apixaban, edoxaban, dabigatran, warfarin, enoxaparin) taken in the last 14 days?  n           Pharmacy/Pharmacies Pt utilizes : [phone]

## 2024-02-14 NOTE — ED PROVIDER NOTES
"ED Provider Note    CHIEF COMPLAINT  Chief Complaint   Patient presents with    Arm Pain     Pt c/o JUANA Pn - [place], currently numb & tingling from elbow to all 5-digits, Pt denied having any trauma;    Rib Pain     Rt side in the back, Pt stated that he was in his truck and tried to open the passenger door hit ribs on center console, happened Thursday;       HPI  Abebe Yin is a 69 y.o. male who presents for evaluation of an episode of pain to the \"entire left arm\" which started on Sunday around 2 PM in the afternoon while he was watching the football game at a [employer].  He was doing nothing in particular and had not had any strenuous exercise or use of the arm beyond the ordinary events of his daily life.  He notes that the severe pain lasted around 30 minutes and resolved although he feels he has residual tingling to all fingertips on the same side.  Patient additionally notes that he has pain to the right posterior lateral rib region around the costal margin after he leaned over the console of his truck attempting to open the passenger side door from the  side.  He notes that this action pressed his ribs against the console and he heard \"a pop\" and felt pain.  He has had pain since.  He has had no hemoptysis, fevers, chills, or shortness of breath.  EXTERNAL RECORDS REVIEWED  Reviewed recent admission for neutropenia.  Patient has a history of prostate cancer, CAD, type 2 diabetes, myelofibrosis, and pancytopenia.  Reviewed admission to [facility] in October 2019 for TIA.  ROS  Constitutional: No fevers or chills  Skin: No rashes  HEENT: No sore throat, runny nose  Neck: No neck pain  Chest: No pain or rashes  Pulm: No shortness of breath, cough, wheezing, stridor, or pain with inspiration/expiration  Gastrointestinal: No nausea, vomiting, diarrhea, constipation, bloating, melena, hematochezia or abdominal pain.  Genitourinary: No dysuria or " "hematuria  Musculoskeletal: No current pain, swelling, redness, or weakness to affected extremity.  Neurologic: Subtle tingling sensation in left fingertips.  Otherwise, no sensory or focal motor changes to extremities. No confusion or disorientation.  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 No pertinent family history.    PAST MEDICAL HISTORY   has a past medical history of CAD (coronary artery disease), Cancer (HCC), Diabetes (HCC), Hypertension, and Stroke (HCC).    SOCIAL HISTORY  Social History     Tobacco Use    Smoking status: Former     Types: Cigarettes    Smokeless tobacco: Never   Vaping Use    Vaping Use: Never used   Substance and Sexual Activity    Alcohol use: Not Currently    Drug use: Not Currently    Sexual activity: Not on file       SURGICAL HISTORY  patient denies any surgical history    CURRENT MEDICATIONS  Home Medications       Reviewed by Celeste Gomez (Pharmacy Tech) on 02/14/24 at 1143  Med List Status: Complete     Medication Last Dose Status   alfuzosin (UROXATRAL) 10 MG SR tablet 2/14/2024 Active   atorvastatin (LIPITOR) 20 MG Tab 2/13/2024 Active   clopidogrel (PLAVIX) 75 MG Tab 2/14/2024 Active   finasteride (PROSCAR) 5 MG Tab 2/14/2024 Active   meloxicam (MOBIC) 15 MG tablet 2/13/2024 Active   metformin (GLUCOPHAGE) 1000 MG tablet 2/14/2024 Active   metoprolol tartrate (LOPRESSOR) 25 MG Tab 2/14/2024 Active   potassium citrate SR (UROCIT-K SR) 10 MEQ (1080 MG) Tab CR 2/14/2024 Active                     ALLERGIES  No Known Allergies    PHYSICAL EXAM  VITAL SIGNS: /65   Pulse (!) 59   Temp 35.9 °C (96.6 °F) (Temporal)   Resp 16   Ht 1.778 m (5' 10\")   Wt 69 kg (152 lb 1.9 oz)   SpO2 96%   BMI 21.83 kg/m²    Gen: Alert in no apparent distress.  HEENT: No signs of trauma, Bilateral external ears normal, Nose " normal. Conjunctiva normal, Non-icteric.   Cardiovascular: Regular rate and rhythm.  Capillary refill less than 3 seconds to all extremities, 2+ distal pulses.  Thorax & Lungs: Normal breath sounds, No respiratory distress, No wheezing bilateral chest rise.  Mild amount of tenderness to right lateral costal margin.  No subcutaneous emphysema, crepitus, or ecchymosis noted.  Abdomen: Bowel sounds normal, Soft, No tenderness  Skin: Warm, Dry, No erythema, No rash noted to exposed areas.   Back: No bony tenderness, No CVA tenderness.   Extremities: Intact distal pulses, No edema  Neurologic: Alert , no facial droop, grossly normal coordination and strength.  Sensation intact light touch to all fingers on the affected side however there is a subjective decrease in sensation compared to the right.  Psychiatric: Affect pleasant    INITIAL IMPRESSION  Patient arrives for evaluation of what appears to be an episode of pain and residual tingling to the left upper extremity.  Patient is concern for decrease in blood flow to the arm.  Currently he has excellent pulses and perfusion and has no edema, erythema, or tenderness to palpation throughout the entire upper extremity.  He has no objective neurologic findings but does have subtle sensory changes to the fingertips.  I suspect this is a peripheral neuropathy issue but given that it was transient, 2 days ago, and associated with pain, I very much doubt it was related to TIA or CVA.  He does have a history of some form of lacunar TIA in the right hemisphere affecting the left upper extremity in 2019 but it was associated with sensory loss and not pain, as it was today.  He had no other symptoms to any other extremities, or face and I do not feel angiography of the head and neck is necessary.  If his symptoms recur MRI will be considered.  Patient states understanding that laboratory evaluation will be undertaken and likely CTA of the chest to rule out a pulmonary embolism as  he does have history of neoplastic issues.  He also had some low-energy trauma to the right costal margin which could have resulted in a rib fracture.  Unclear if this is in any way related to the left upper extremity symptoms.  Patient appears comfortable and nontoxic.  I do not suspect infectious or inflammatory issue.    ED observation? No  LABS  Results for orders placed or performed during the hospital encounter of 02/14/24   CBC WITH DIFFERENTIAL   Result Value Ref Range    WBC 3.2 (L) 4.8 - 10.8 K/uL    RBC 3.65 (L) 4.70 - 6.10 M/uL    Hemoglobin 12.4 (L) 14.0 - 18.0 g/dL    Hematocrit 36.4 (L) 42.0 - 52.0 %    MCV 99.7 (H) 81.4 - 97.8 fL    MCH 34.0 (H) 27.0 - 33.0 pg    MCHC 34.1 32.3 - 36.5 g/dL    RDW 95.0 (H) 35.9 - 50.0 fL    Platelet Count 118 (L) 164 - 446 K/uL    MPV 11.2 9.0 - 12.9 fL    Neutrophils-Polys 48.00 44.00 - 72.00 %    Lymphocytes 32.00 22.00 - 41.00 %    Monocytes 14.00 (H) 0.00 - 13.40 %    Eosinophils 1.00 0.00 - 6.90 %    Basophils 0.00 0.00 - 1.80 %    Nucleated RBC 0.60 (H) 0.00 - 0.20 /100 WBC    Neutrophils (Absolute) 1.66 (L) 1.82 - 7.42 K/uL    Lymphs (Absolute) 1.02 1.00 - 4.80 K/uL    Monos (Absolute) 0.45 0.00 - 0.85 K/uL    Eos (Absolute) 0.03 0.00 - 0.51 K/uL    Baso (Absolute) 0.00 0.00 - 0.12 K/uL    NRBC (Absolute) 0.02 K/uL    Anisocytosis 1+     Macrocytosis 1+     Microcytosis 1+    COMP METABOLIC PANEL   Result Value Ref Range    Sodium 141 135 - 145 mmol/L    Potassium 4.5 3.6 - 5.5 mmol/L    Chloride 104 96 - 112 mmol/L    Co2 26 20 - 33 mmol/L    Anion Gap 11.0 7.0 - 16.0    Glucose 143 (H) 65 - 99 mg/dL    Bun 18 8 - 22 mg/dL    Creatinine 0.89 0.50 - 1.40 mg/dL    Calcium 9.3 8.4 - 10.2 mg/dL    Correct Calcium 8.7 8.5 - 10.5 mg/dL    AST(SGOT) 14 12 - 45 U/L    ALT(SGPT) 18 2 - 50 U/L    Alkaline Phosphatase 88 30 - 99 U/L    Total Bilirubin 1.4 0.1 - 1.5 mg/dL    Albumin 4.7 3.2 - 4.9 g/dL    Total Protein 7.5 6.0 - 8.2 g/dL    Globulin 2.8 1.9 - 3.5 g/dL     A-G Ratio 1.7 g/dL   TROPONIN   Result Value Ref Range    Troponin T 12 6 - 19 ng/L   LACTIC ACID   Result Value Ref Range    Lactic Acid 2.5 (H) 0.5 - 2.0 mmol/L   PROTHROMBIN TIME (INR)   Result Value Ref Range    PT 14.8 (H) 12.0 - 14.6 sec    INR 1.11 0.87 - 1.13   APTT   Result Value Ref Range    APTT 207.2 (HH) 24.7 - 36.0 sec   D-DIMER   Result Value Ref Range    D-Dimer 0.42 0.00 - 0.50 ug/mL (FEU)   ESTIMATED GFR   Result Value Ref Range    GFR (CKD-EPI) 94 >60 mL/min/1.73 m 2   DIFFERENTIAL MANUAL   Result Value Ref Range    Bands-Stabs 4.00 0.00 - 10.00 %    Metamyelocytes 1.00 %    Manual Diff Status PERFORMED    PLATELET ESTIMATE   Result Value Ref Range    Plt Estimation Decreased    MORPHOLOGY   Result Value Ref Range    RBC Morphology Present     Poikilocytosis 2+     Ovalocytes 1+     Schistocytes 1+     Tear Drop Cells 1+    EKG   Result Value Ref Range    Report       [facility] Emergency Dept.    Test Date:  2024  Pt Name:    LELAND OROZCO                Department: [facility]  MRN:        [id number]                      Room:       -ROOM 4  Gender:     Male                         Technician: jose  :        [id number]                   Requested By:ER TRIAGE PROTOCOL  Order #:    [id number]                    Reading MD:    Measurements  Intervals                                Axis  Rate:       58                           P:          41  WI:         169                          QRS:        5  QRSD:       84                           T:          69  QT:         436  QTc:        429    Interpretive Statements  Sinus rhythm  Abnormal R-wave progression, early transition  Nonspecific T abnrm, anterolateral leads  Compared to ECG 2024 18:29:38  Sinus bradycardia no longer present       I have independently interpreted this EKG  Sinus rhythm rate of 58, intervals appear to be within normal limits, there are no convincing ST or T wave changes to suggest ischemia, there  is not appear to be any ectopy.  When compared to EKG performed in January this year there is no significant changes.  I have independently interpreted the diagnostic imaging associated with this visit and am waiting the final reading from the radiologist.   My preliminary interpretation is a follows: Single view chest x-ray: No convincing pulmonary opacities to suggest infiltrates or effusions.  Cardiomediastinal silhouette appears within normal limits.  No obvious rib fractures or pneumothorax.  RADIOLOGY  CT-CTA CHEST PULMONARY ARTERY W/ RECONS   Final Result      1.  No evidence of pulmonary embolus.      2.  Biapical scarring.      3.  Atherosclerotic change aorta and coronary arteries.      4.  Prior CABG and cholecystectomy.      DX-CHEST-PORTABLE (1 VIEW)   Final Result      No evidence of acute cardiopulmonary process.            COURSE & MEDICAL DECISION MAKING  Pertinent Labs & Imaging studies reviewed. (See chart for details)  Patient's labs demonstrated findings that are consistent with previous measurements including pancytopenia, although not as marked as it has been in the past, and a monocyte predominance to the differential.  ANC is 1.66 which is reassuring as well.  It has been as low as 0.36 on January 3 of this year.  He had significant red blood cell morphology changes but these are consistent with his past readings as well.  I do not feel these need to be addressed emergently as they do not represent an acute problem.  He did have a mildly elevated lactate however this is nonspecific and I do not feel it is related to sepsis or impending sepsis.  As is typical for people with a factor XII deficiency, his PTT was markedly elevated.  This is not emergent problem nor does it require any further inpatient evaluation.  His CT was reassuring and that there was no embolus or thrombosis noted and no other findings to suggest need for inpatient evaluation.  He was not reassured that the central vessels  did not appear to have an occlusion which is consistent with physical exam.  I again discussed differential diagnosis and I suspect this is a peripheral neuropathy issue.  Fortunately it was short-lived and resolved spontaneously.  There are some residual symptoms which will require further follow-up with his primary care physician but I do not feel CTA of the head or neck, or emergent MRI will benefit the patient or .  His symptoms today are different from his previous lacunar infarct symptoms 5 years ago and that he has pain today which is not typical for lacunar infarcts.  Patient states understanding that if symptoms worsen or change he needs to return for reevaluation otherwise follow-up with his primary care physician.  He was comfortable with the plan for discharge and follow-up.      I have discussed management of the patient with the following physicians and ROBERT's:  none    Escalation of care considered, and ultimately not performed:acute inpatient care management, however at this time, the patient is most appropriate for outpatient management, CT angiography of the head and neck, MRI of the head    Barriers to care at this time, including but not limited to: none.     Decision tools and Rx drugs considered including, but not limited to :none     Discussion of management with other QHP or appropriate source(s): none    The patient will return for worsening symptoms and is stable at the time of discharge. The patient verbalizes understanding and will comply.    FINAL IMPRESSION  1. Left arm pain        Electronically signed by: Wyatt Cano M.D., 2/14/2024 9:43 AM

## 2024-02-14 NOTE — ED NOTES
Pt states he had bad arm pain in his left arm on Sunday. Pt states the pain went away and then he noticed it was numb. Pt still feels some residual pain in his left arm and numbness in his left arm. Pt states he broke a rib last Thursday. Pt states he is on blood thinners. Pt states he has had 4 to 5 mini strokes in the past.

## 2024-02-14 NOTE — ED NOTES
tech from Lab called with critical result of .2 at 1111. Critical lab result read back to tech.   Dr. Cano notified of critical lab result at 1111.  Critical lab result read back by Dr. Cano.

## 2024-02-14 NOTE — ED NOTES
Pt cleared for d/c  dischg instructions given to pt  verbally understands  d/c'ed to home in NAD  instructed pt to f/u w/ PCP as needed

## 2024-04-02 ENCOUNTER — HOSPITAL ENCOUNTER (OUTPATIENT)
Dept: LAB | Facility: MEDICAL CENTER | Age: 69
End: 2024-04-02
Attending: INTERNAL MEDICINE
Payer: MEDICARE

## 2024-04-02 LAB
ALBUMIN SERPL BCP-MCNC: 4.9 G/DL (ref 3.2–4.9)
ALBUMIN/GLOB SERPL: 1.7 G/DL
ALP SERPL-CCNC: 97 U/L (ref 30–99)
ALT SERPL-CCNC: 14 U/L (ref 2–50)
ANION GAP SERPL CALC-SCNC: 12 MMOL/L (ref 7–16)
AST SERPL-CCNC: 12 U/L (ref 12–45)
BASOPHILS # BLD AUTO: 0.3 % (ref 0–1.8)
BASOPHILS # BLD: 0.01 K/UL (ref 0–0.12)
BILIRUB SERPL-MCNC: 1.5 MG/DL (ref 0.1–1.5)
BUN SERPL-MCNC: 16 MG/DL (ref 8–22)
CALCIUM ALBUM COR SERPL-MCNC: 8.3 MG/DL (ref 8.5–10.5)
CALCIUM SERPL-MCNC: 9 MG/DL (ref 8.4–10.2)
CHLORIDE SERPL-SCNC: 104 MMOL/L (ref 96–112)
CO2 SERPL-SCNC: 23 MMOL/L (ref 20–33)
CREAT SERPL-MCNC: 0.68 MG/DL (ref 0.5–1.4)
EOSINOPHIL # BLD AUTO: 0.05 K/UL (ref 0–0.51)
EOSINOPHIL NFR BLD: 1.4 % (ref 0–6.9)
ERYTHROCYTE [DISTWIDTH] IN BLOOD BY AUTOMATED COUNT: 91 FL (ref 35.9–50)
GFR SERPLBLD CREATININE-BSD FMLA CKD-EPI: 102 ML/MIN/1.73 M 2
GLOBULIN SER CALC-MCNC: 2.9 G/DL (ref 1.9–3.5)
GLUCOSE SERPL-MCNC: 132 MG/DL (ref 65–99)
HCT VFR BLD AUTO: 37 % (ref 42–52)
HGB BLD-MCNC: 12.6 G/DL (ref 14–18)
IMM GRANULOCYTES # BLD AUTO: 0.05 K/UL (ref 0–0.11)
IMM GRANULOCYTES NFR BLD AUTO: 1.4 % (ref 0–0.9)
LDH SERPL L TO P-CCNC: 157 U/L (ref 107–266)
LYMPHOCYTES # BLD AUTO: 1.33 K/UL (ref 1–4.8)
LYMPHOCYTES NFR BLD: 35.9 % (ref 22–41)
MCH RBC QN AUTO: 33.8 PG (ref 27–33)
MCHC RBC AUTO-ENTMCNC: 34.1 G/DL (ref 32.3–36.5)
MCV RBC AUTO: 99.2 FL (ref 81.4–97.8)
MONOCYTES # BLD AUTO: 0.69 K/UL (ref 0–0.85)
MONOCYTES NFR BLD AUTO: 18.6 % (ref 0–13.4)
NEUTROPHILS # BLD AUTO: 1.57 K/UL (ref 1.82–7.42)
NEUTROPHILS NFR BLD: 42.4 % (ref 44–72)
NRBC # BLD AUTO: 0.04 K/UL
NRBC BLD-RTO: 1.1 /100 WBC (ref 0–0.2)
PLATELET # BLD AUTO: 132 K/UL (ref 164–446)
PMV BLD AUTO: 12.2 FL (ref 9–12.9)
POTASSIUM SERPL-SCNC: 3.8 MMOL/L (ref 3.6–5.5)
PROT SERPL-MCNC: 7.8 G/DL (ref 6–8.2)
RBC # BLD AUTO: 3.73 M/UL (ref 4.7–6.1)
SODIUM SERPL-SCNC: 139 MMOL/L (ref 135–145)
WBC # BLD AUTO: 3.7 K/UL (ref 4.8–10.8)

## 2024-04-02 PROCEDURE — 85025 COMPLETE CBC W/AUTO DIFF WBC: CPT

## 2024-04-02 PROCEDURE — 80053 COMPREHEN METABOLIC PANEL: CPT

## 2024-04-02 PROCEDURE — 83615 LACTATE (LD) (LDH) ENZYME: CPT

## 2024-04-02 PROCEDURE — 36415 COLL VENOUS BLD VENIPUNCTURE: CPT

## 2025-02-22 ENCOUNTER — HOSPITAL ENCOUNTER (EMERGENCY)
Facility: MEDICAL CENTER | Age: 70
End: 2025-02-23
Attending: STUDENT IN AN ORGANIZED HEALTH CARE EDUCATION/TRAINING PROGRAM
Payer: MEDICARE

## 2025-02-22 DIAGNOSIS — R33.9 URINARY RETENTION: ICD-10-CM

## 2025-02-22 DIAGNOSIS — R31.29 OTHER MICROSCOPIC HEMATURIA: ICD-10-CM

## 2025-02-22 DIAGNOSIS — I10 ACCELERATED HYPERTENSION: ICD-10-CM

## 2025-02-22 PROCEDURE — 99284 EMERGENCY DEPT VISIT MOD MDM: CPT

## 2025-02-22 PROCEDURE — 51702 INSERT TEMP BLADDER CATH: CPT

## 2025-02-22 PROCEDURE — 700101 HCHG RX REV CODE 250: Performed by: STUDENT IN AN ORGANIZED HEALTH CARE EDUCATION/TRAINING PROGRAM

## 2025-02-22 PROCEDURE — 81001 URINALYSIS AUTO W/SCOPE: CPT

## 2025-02-22 PROCEDURE — 303105 HCHG CATHETER EXTRA

## 2025-02-22 RX ORDER — LIDOCAINE HYDROCHLORIDE 20 MG/ML
JELLY TOPICAL ONCE
Status: COMPLETED | OUTPATIENT
Start: 2025-02-22 | End: 2025-02-22

## 2025-02-22 RX ADMIN — LIDOCAINE HYDROCHLORIDE 6 ML: 20 JELLY TOPICAL at 23:45

## 2025-02-22 ASSESSMENT — FIBROSIS 4 INDEX: FIB4 SCORE: 1.65216040454953256

## 2025-02-23 VITALS
TEMPERATURE: 97.2 F | HEART RATE: 92 BPM | SYSTOLIC BLOOD PRESSURE: 156 MMHG | DIASTOLIC BLOOD PRESSURE: 70 MMHG | BODY MASS INDEX: 21.97 KG/M2 | RESPIRATION RATE: 16 BRPM | WEIGHT: 153.44 LBS | OXYGEN SATURATION: 98 % | HEIGHT: 70 IN

## 2025-02-23 LAB
APPEARANCE UR: CLEAR
BACTERIA #/AREA URNS HPF: ABNORMAL /HPF
BILIRUB UR QL STRIP.AUTO: NEGATIVE
CASTS URNS QL MICRO: ABNORMAL /LPF (ref 0–2)
COLOR UR: YELLOW
EPITHELIAL CELLS 1715: ABNORMAL /HPF (ref 0–5)
GLUCOSE UR STRIP.AUTO-MCNC: 100 MG/DL
KETONES UR STRIP.AUTO-MCNC: ABNORMAL MG/DL
LEUKOCYTE ESTERASE UR QL STRIP.AUTO: ABNORMAL
MICRO URNS: ABNORMAL
NITRITE UR QL STRIP.AUTO: NEGATIVE
PH UR STRIP.AUTO: 5.5 [PH] (ref 5–8)
PROT UR QL STRIP: 30 MG/DL
RBC # URNS HPF: ABNORMAL /HPF
RBC UR QL AUTO: ABNORMAL
SP GR UR STRIP.AUTO: 1.03
UROBILINOGEN UR STRIP.AUTO-MCNC: 0.2 EU/DL
WBC #/AREA URNS HPF: ABNORMAL /HPF

## 2025-02-23 NOTE — ED NOTES
"PT states \"I need a catheter\" pt had surgery last week and since the catheter was removed he's been able to get \"like 20 drops an hour and been unable to sleep\". Pt has been retaining urine since last night and has noticed decreased voiding from 20 drops per hour to maybe 1-2.  "

## 2025-02-23 NOTE — ED PROVIDER NOTES
"ED Provider Note    CHIEF COMPLAINT  Chief Complaint   Patient presents with    Urinary Catheter Problem     PT states \"I need a catheter\" pt had surgery last week and since the catheter was removed he's been able to get \"like 20 drops an hour and been unable to sleep\". Pt has been retaining urine since last night and has noticed decreased voiding from 20 drops per hour to maybe 1-2.       EXTERNAL RECORDS REVIEWED  Outpatient Notes office visit February 10, 2025 for andrés hematuria history of radiation therapy.    HPI/ROS  LIMITATION TO HISTORY   Select: : None  OUTSIDE HISTORIAN(S):  None    Abebe Yin is a 70 y.o. male who presents for acute urinary retention.  The patient had a urologic surgery last week for \"blood clot in his bladder\" and he evidently had prostate resection at that time during cystoscopy.  Marcial was removed on day of surgery.  He had overall normal urination for a few days postoperatively but for 3 days has had progressively worsening retention.  He is now dribbling a few drops at a time since last night.  He is having belly fullness in the suprapubic region.  No fevers, no blood in his urine recently.    PAST MEDICAL HISTORY   has a past medical history of CAD (coronary artery disease), Cancer (HCC), Diabetes (HCC), Hypertension, and Stroke (HCC).    SURGICAL HISTORY  patient denies any surgical history    FAMILY HISTORY  No family history on file.    SOCIAL HISTORY  Social History     Tobacco Use    Smoking status: Former     Types: Cigarettes    Smokeless tobacco: Never   Vaping Use    Vaping status: Never Used   Substance and Sexual Activity    Alcohol use: Not Currently    Drug use: Not Currently    Sexual activity: Not on file       CURRENT MEDICATIONS  Home Medications    **Home medications have not yet been reviewed for this encounter**         ALLERGIES  No Known Allergies    PHYSICAL EXAM  VITAL SIGNS: BP (!) 156/70   Pulse 92   Temp 36.2 °C (97.2 °F) (Temporal)   Resp 16 " "  Ht 1.778 m (5' 10\")   Wt 69.6 kg (153 lb 7 oz)   SpO2 98%   BMI 22.02 kg/m²    Pulse oximetry interpretation: I interpret the pulse oximetry as normal.  Constitutional: Awake and alert. No acute distress.  Head: NCAT.  HEENT: Normal Conjunctiva.  Neck: Grossly normal range of motion. Airway midline.  Cardiovascular: Normal heart rate, Normal rhythm.  Thorax & Lungs: No respiratory distress. Clear to Auscultation bilaterally.  Abdomen: Normal inspection. Suprapubic tenderness and fullness. No rebound or guarding. Nondistended  Skin: No obvious rash.  Back: No tenderness, No CVA tenderness.   Musculoskeletal: No obvious deformity. Moves all extremities Well.  Neurologic: A&Ox4.   Psychiatric: Mood and affect are appropriate for situation.    EKG/LABS  Results for orders placed or performed during the hospital encounter of 02/22/25   URINALYSIS,CULTURE IF INDICATED    Collection Time: 02/22/25 11:44 PM    Specimen: Urine, Marcial Cath   Result Value Ref Range    Color Yellow     Character Clear     Specific Gravity 1.028 <1.035    Ph 5.5 5.0 - 8.0    Glucose 100 (A) Negative mg/dL    Ketones Trace (A) Negative mg/dL    Protein 30 (A) Negative mg/dL    Bilirubin Negative Negative    Urobilinogen, Urine 0.2 <=1.0 EU/dL    Nitrite Negative Negative    Leukocyte Esterase Trace (A) Negative    Occult Blood Small (A) Negative    Micro Urine Req Microscopic    URINE MICROSCOPIC (W/UA)    Collection Time: 02/22/25 11:44 PM   Result Value Ref Range    WBC 3-5 (A) /hpf    RBC 11-20 (A) /hpf    Bacteria None Seen None /hpf    Epithelial Cells 0-2 0 - 5 /hpf    Urine Casts 3-5 (A) 0 - 2 /lpf     COURSE & MEDICAL DECISION MAKING    ASSESSMENT, COURSE AND PLAN  Care Narrative:   70-year-old male history of prostate cancer status post recent prostate resection and cystoscopy by urology presents for urinary retention without fevers  Afebrile and reassuring vitals  On exam he has suprapubic fullness and tenderness to palpation, no " tenderness in other quadrants.  Discussed Marcial placement and patient is requesting this.  Marcial past on first attempt without difficulty by bedside nurse.  He is feeling much better after Marcial was placed and urine was draining.  The urine was obtained and not suspicious at this time for infection.  There is occult blood consistent with his recent surgery.  He is instructed to follow-up with urology and Marcial will remain in place until their guidance for removal.  Patient understood instructions and agreeable with discharge.      ADDITIONAL PROBLEMS MANAGED    Accelerated hypertension    DISPOSITION AND DISCUSSIONS  I have discussed management of the patient with the following physicians and ROBERT's:  none    Discussion of management with other QHP or appropriate source(s): None     Escalation of care considered, and ultimately not performed:Laboratory analysis and acute inpatient care management, however at this time, the patient is most appropriate for outpatient management    Barriers to care at this time, including but not limited to:  None .     Decision tools and prescription drugs considered including, but not limited to:  None .    FINAL DIAGNOSIS  1. Urinary retention    2. Other microscopic hematuria    3. Accelerated hypertension        Electronically signed by: Mehdi Ohara D.O., 2/22/2025 11:16 PM

## 2025-02-23 NOTE — ED TRIAGE NOTES
"Chief Complaint   Patient presents with    Urinary Catheter Problem     PT states \"I need a catheter\" pt had surgery last week and since the catheter was removed he's been able to get \"like 20 drops an hour and been unable to sleep\". Pt has been retaining urine since last night and has noticed decreased voiding from 20 drops per hour to maybe 1-2.     BP (!) 167/71   Pulse 65   Temp 36.1 °C (97 °F) (Temporal)   Resp 20   Ht 1.778 m (5' 10\")   Wt 69.6 kg (153 lb 7 oz)   SpO2 97%   BMI 22.02 kg/m²     "

## 2025-02-23 NOTE — DISCHARGE INSTRUCTIONS
Please follow-up with urology to discuss appropriate timing of Marcial removal.  Your urine today does not show infection, there is some small blood likely expected after surgery.  You may need this fully for several days to weeks until urology feels that you can safely remove it without having retention again.    Should you have fevers, inability to urinate despite the Marcial or other symptoms you are concerned about please return for reevaluation.

## 2025-05-03 ENCOUNTER — HOSPITAL ENCOUNTER (EMERGENCY)
Facility: MEDICAL CENTER | Age: 70
End: 2025-05-03
Attending: EMERGENCY MEDICINE
Payer: MEDICARE

## 2025-05-03 VITALS
RESPIRATION RATE: 16 BRPM | SYSTOLIC BLOOD PRESSURE: 173 MMHG | OXYGEN SATURATION: 97 % | HEIGHT: 70 IN | DIASTOLIC BLOOD PRESSURE: 74 MMHG | BODY MASS INDEX: 21.71 KG/M2 | WEIGHT: 151.68 LBS | HEART RATE: 61 BPM | TEMPERATURE: 97.6 F

## 2025-05-03 DIAGNOSIS — R33.9 URINARY RETENTION: ICD-10-CM

## 2025-05-03 DIAGNOSIS — Z86.73 HISTORY OF TIA (TRANSIENT ISCHEMIC ATTACK): ICD-10-CM

## 2025-05-03 DIAGNOSIS — R31.9 HEMATURIA, UNSPECIFIED TYPE: ICD-10-CM

## 2025-05-03 DIAGNOSIS — N39.0 ACUTE UTI: ICD-10-CM

## 2025-05-03 LAB
ACANTHOCYTES BLD QL SMEAR: NORMAL
ALBUMIN SERPL BCP-MCNC: 4.7 G/DL (ref 3.2–4.9)
ALBUMIN/GLOB SERPL: 2.1 G/DL
ALP SERPL-CCNC: 79 U/L (ref 30–99)
ALT SERPL-CCNC: 17 U/L (ref 2–50)
ANION GAP SERPL CALC-SCNC: 12 MMOL/L (ref 7–16)
ANISOCYTOSIS BLD QL SMEAR: ABNORMAL
APPEARANCE UR: ABNORMAL
APTT PPP: 230.5 SEC (ref 24.7–36)
AST SERPL-CCNC: 13 U/L (ref 12–45)
BACTERIA #/AREA URNS HPF: ABNORMAL /HPF
BASOPHILS # BLD AUTO: 0.9 % (ref 0–1.8)
BASOPHILS # BLD: 0.04 K/UL (ref 0–0.12)
BILIRUB SERPL-MCNC: 1.3 MG/DL (ref 0.1–1.5)
BILIRUB UR QL STRIP.AUTO: ABNORMAL
BUN SERPL-MCNC: 16 MG/DL (ref 8–22)
CALCIUM ALBUM COR SERPL-MCNC: 8.3 MG/DL (ref 8.5–10.5)
CALCIUM SERPL-MCNC: 8.9 MG/DL (ref 8.5–10.5)
CASTS URNS QL MICRO: 0 /LPF (ref 0–2)
CHLORIDE SERPL-SCNC: 103 MMOL/L (ref 96–112)
CO2 SERPL-SCNC: 22 MMOL/L (ref 20–33)
COLOR UR: ABNORMAL
CREAT SERPL-MCNC: 0.94 MG/DL (ref 0.5–1.4)
DACRYOCYTES BLD QL SMEAR: NORMAL
EOSINOPHIL # BLD AUTO: 0 K/UL (ref 0–0.51)
EOSINOPHIL NFR BLD: 0 % (ref 0–6.9)
EPITHELIAL CELLS 1715: ABNORMAL /HPF (ref 0–5)
ERYTHROCYTE [DISTWIDTH] IN BLOOD BY AUTOMATED COUNT: 102.2 FL (ref 35.9–50)
GFR SERPLBLD CREATININE-BSD FMLA CKD-EPI: 88 ML/MIN/1.73 M 2
GLOBULIN SER CALC-MCNC: 2.2 G/DL (ref 1.9–3.5)
GLUCOSE SERPL-MCNC: 153 MG/DL (ref 65–99)
GLUCOSE UR STRIP.AUTO-MCNC: NEGATIVE MG/DL
HCT VFR BLD AUTO: 34.5 % (ref 42–52)
HGB BLD-MCNC: 11.3 G/DL (ref 14–18)
HYPOCHROMIA BLD QL SMEAR: ABNORMAL
INR PPP: 1.04 (ref 0.87–1.13)
KETONES UR STRIP.AUTO-MCNC: ABNORMAL MG/DL
LEUKOCYTE ESTERASE UR QL STRIP.AUTO: ABNORMAL
LYMPHOCYTES # BLD AUTO: 0.71 K/UL (ref 1–4.8)
LYMPHOCYTES NFR BLD: 17.2 % (ref 22–41)
MACROCYTES BLD QL SMEAR: ABNORMAL
MANUAL DIFF BLD: NORMAL
MCH RBC QN AUTO: 33.2 PG (ref 27–33)
MCHC RBC AUTO-ENTMCNC: 32.8 G/DL (ref 32.3–36.5)
MCV RBC AUTO: 101.5 FL (ref 81.4–97.8)
METAMYELOCYTES NFR BLD MANUAL: 1.7 %
MICRO URNS: ABNORMAL
MICROCYTES BLD QL SMEAR: ABNORMAL
MONOCYTES # BLD AUTO: 0.46 K/UL (ref 0–0.85)
MONOCYTES NFR BLD AUTO: 11.2 % (ref 0–13.4)
MYELOCYTES NFR BLD MANUAL: 0.9 %
NEUTROPHILS # BLD AUTO: 2.79 K/UL (ref 1.82–7.42)
NEUTROPHILS NFR BLD: 68.1 % (ref 44–72)
NITRITE UR QL STRIP.AUTO: POSITIVE
NRBC # BLD AUTO: 0.07 K/UL
NRBC BLD-RTO: 1.7 /100 WBC (ref 0–0.2)
OVALOCYTES BLD QL SMEAR: NORMAL
PH UR STRIP.AUTO: 6.5 [PH] (ref 5–8)
PLATELET # BLD AUTO: 111 K/UL (ref 164–446)
PLATELET BLD QL SMEAR: NORMAL
PMV BLD AUTO: 11.2 FL (ref 9–12.9)
POIKILOCYTOSIS BLD QL SMEAR: NORMAL
POLYCHROMASIA BLD QL SMEAR: NORMAL
POTASSIUM SERPL-SCNC: 4.3 MMOL/L (ref 3.6–5.5)
PROT SERPL-MCNC: 6.9 G/DL (ref 6–8.2)
PROT UR QL STRIP: >=300 MG/DL
PROTHROMBIN TIME: 13.9 SEC (ref 12–14.6)
RBC # BLD AUTO: 3.4 M/UL (ref 4.7–6.1)
RBC # URNS HPF: ABNORMAL /HPF
RBC BLD AUTO: PRESENT
RBC UR QL AUTO: ABNORMAL
SCHISTOCYTES BLD QL SMEAR: NORMAL
SODIUM SERPL-SCNC: 137 MMOL/L (ref 135–145)
SP GR UR STRIP.AUTO: >=1.03
UROBILINOGEN UR STRIP.AUTO-MCNC: 1 EU/DL
WBC # BLD AUTO: 4.1 K/UL (ref 4.8–10.8)
WBC #/AREA URNS HPF: ABNORMAL /HPF

## 2025-05-03 PROCEDURE — 80053 COMPREHEN METABOLIC PANEL: CPT

## 2025-05-03 PROCEDURE — 85610 PROTHROMBIN TIME: CPT

## 2025-05-03 PROCEDURE — 96375 TX/PRO/DX INJ NEW DRUG ADDON: CPT

## 2025-05-03 PROCEDURE — 96374 THER/PROPH/DIAG INJ IV PUSH: CPT

## 2025-05-03 PROCEDURE — 51700 IRRIGATION OF BLADDER: CPT

## 2025-05-03 PROCEDURE — 81001 URINALYSIS AUTO W/SCOPE: CPT

## 2025-05-03 PROCEDURE — 85730 THROMBOPLASTIN TIME PARTIAL: CPT

## 2025-05-03 PROCEDURE — 700111 HCHG RX REV CODE 636 W/ 250 OVERRIDE (IP): Mod: JZ | Performed by: EMERGENCY MEDICINE

## 2025-05-03 PROCEDURE — 85027 COMPLETE CBC AUTOMATED: CPT

## 2025-05-03 PROCEDURE — 36415 COLL VENOUS BLD VENIPUNCTURE: CPT

## 2025-05-03 PROCEDURE — 700101 HCHG RX REV CODE 250

## 2025-05-03 PROCEDURE — 51798 US URINE CAPACITY MEASURE: CPT

## 2025-05-03 PROCEDURE — 85007 BL SMEAR W/DIFF WBC COUNT: CPT

## 2025-05-03 PROCEDURE — 99284 EMERGENCY DEPT VISIT MOD MDM: CPT

## 2025-05-03 RX ORDER — LIDOCAINE HYDROCHLORIDE 20 MG/ML
JELLY TOPICAL ONCE
Status: COMPLETED | OUTPATIENT
Start: 2025-05-03 | End: 2025-05-03

## 2025-05-03 RX ORDER — CEPHALEXIN 500 MG/1
500 CAPSULE ORAL 3 TIMES DAILY
Qty: 15 CAPSULE | Refills: 0 | Status: ACTIVE | OUTPATIENT
Start: 2025-05-03 | End: 2025-05-08

## 2025-05-03 RX ORDER — HYDROMORPHONE HYDROCHLORIDE 1 MG/ML
1 INJECTION, SOLUTION INTRAMUSCULAR; INTRAVENOUS; SUBCUTANEOUS ONCE
Status: COMPLETED | OUTPATIENT
Start: 2025-05-03 | End: 2025-05-03

## 2025-05-03 RX ORDER — CEFTRIAXONE 2 G/1
2000 INJECTION, POWDER, FOR SOLUTION INTRAMUSCULAR; INTRAVENOUS ONCE
Status: COMPLETED | OUTPATIENT
Start: 2025-05-03 | End: 2025-05-03

## 2025-05-03 RX ADMIN — CEFTRIAXONE SODIUM 2000 MG: 2 INJECTION, POWDER, FOR SOLUTION INTRAMUSCULAR; INTRAVENOUS at 09:36

## 2025-05-03 RX ADMIN — HYDROMORPHONE HYDROCHLORIDE 1 MG: 1 INJECTION, SOLUTION INTRAMUSCULAR; INTRAVENOUS; SUBCUTANEOUS at 08:15

## 2025-05-03 RX ADMIN — LIDOCAINE HYDROCHLORIDE 1 APPLICATION: 20 JELLY TOPICAL at 08:45

## 2025-05-03 ASSESSMENT — FIBROSIS 4 INDEX: FIB4 SCORE: 1.65216040454953256

## 2025-05-03 NOTE — ED PROVIDER NOTES
ED Provider Note    CHIEF COMPLAINT  Chief Complaint   Patient presents with    Difficulty Urinating     Pt states he has been unable to urinate since 12:30 am, pt believes he has a blood clot left in his bladder from a prior surgery.       EXTERNAL RECORDS REVIEWED  Outpatient Notes seen in urology clinic 3/27/2025 for andrés hematuria.  The patient is on Xarelto due to prior CVA, has a history of prostate cancer, kidney stones, and UTI as well as enlarged prostate.  He has completed XRT for prostate cancer and is on finasteride and alfuzosin.    HPI/ROS  LIMITATION TO HISTORY   Select: : None  OUTSIDE HISTORIAN(S):  None    Abebe Yin is a 70 y.o. male who presents with a chief complaint of urinary retention since 12:30 AM.  The patient has a history of prostate cancer and is on Plavix.  He most recently underwent cystoscopy with partial prostate resection open (per patient) in February of this year in order to remove a blood clot in the bladder.  Since that time, his urine has been persistently blood-tinged however for the past 4 days he has had intermittent gross hematuria.  Last night, he had an episode once again and was able to get to sleep for 3 hours however when he awoke he noted he was unable to urinate.  He is concerned he has a blood clot blocking his urine flow.  He has no fevers.  He has abdominal pain.    PAST MEDICAL HISTORY   has a past medical history of CAD (coronary artery disease), Cancer (HCC), Diabetes (HCC), Hypertension, and Stroke (HCC).    SURGICAL HISTORY  patient denies any surgical history    FAMILY HISTORY  No family history on file.    SOCIAL HISTORY  Social History     Tobacco Use    Smoking status: Former     Types: Cigarettes    Smokeless tobacco: Never   Vaping Use    Vaping status: Never Used   Substance and Sexual Activity    Alcohol use: Not Currently    Drug use: Not Currently    Sexual activity: Not on file       CURRENT MEDICATIONS  Home Medications       Reviewed  "by Ramone Alaniz R.N. (Registered Nurse) on 05/03/25 at 0700  Med List Status: Not Addressed     Medication Last Dose Status   alfuzosin (UROXATRAL) 10 MG SR tablet  Active   atorvastatin (LIPITOR) 20 MG Tab  Active   clopidogrel (PLAVIX) 75 MG Tab  Active   finasteride (PROSCAR) 5 MG Tab  Active   meloxicam (MOBIC) 15 MG tablet  Active   metformin (GLUCOPHAGE) 1000 MG tablet  Active   metoprolol tartrate (LOPRESSOR) 25 MG Tab  Active   potassium citrate SR (UROCIT-K SR) 10 MEQ (1080 MG) Tab CR  Active                    ALLERGIES  No Known Allergies    PHYSICAL EXAM  VITAL SIGNS: /70   Pulse 72   Temp 36.1 °C (96.9 °F) (Temporal)   Resp 18   Ht 1.778 m (5' 10\")   Wt 68.8 kg (151 lb 10.8 oz)   SpO2 98%   BMI 21.76 kg/m²    Physical Exam  Vitals and nursing note reviewed.   Constitutional:       Appearance: Normal appearance.   HENT:      Head: Normocephalic and atraumatic.      Right Ear: External ear normal.      Left Ear: External ear normal.      Nose: Nose normal.      Mouth/Throat:      Mouth: Mucous membranes are moist.      Pharynx: Oropharynx is clear.   Eyes:      Extraocular Movements: Extraocular movements intact.      Conjunctiva/sclera: Conjunctivae normal.      Pupils: Pupils are equal, round, and reactive to light.   Cardiovascular:      Rate and Rhythm: Normal rate.   Pulmonary:      Effort: Pulmonary effort is normal.   Abdominal:      Palpations: Abdomen is soft.      Tenderness: There is abdominal tenderness.      Comments: Suprapubic fullness and tenderness.   Musculoskeletal:         General: Normal range of motion.      Cervical back: Normal range of motion and neck supple.   Skin:     General: Skin is warm and dry.   Neurological:      General: No focal deficit present.      Mental Status: He is alert and oriented to person, place, and time.   Psychiatric:         Mood and Affect: Mood normal.         Behavior: Behavior normal.       EKG/LABS  Results for orders placed or performed " during the hospital encounter of 05/03/25   URINALYSIS,CULTURE IF INDICATED    Collection Time: 05/03/25  7:35 AM    Specimen: Urine, Clean Catch   Result Value Ref Range    Color Brown (A)     Character Cloudy (A)     Specific Gravity >=1.030 <1.035    Ph 6.5 5.0 - 8.0    Glucose Negative Negative mg/dL    Ketones Trace (A) Negative mg/dL    Protein >=300 (A) Negative mg/dL    Bilirubin Moderate (A) Negative    Urobilinogen, Urine 1.0 <=1.0 EU/dL    Nitrite Positive (A) Negative    Leukocyte Esterase Trace (A) Negative    Occult Blood Large (A) Negative    Micro Urine Req Microscopic    URINE MICROSCOPIC (W/UA)    Collection Time: 05/03/25  7:35 AM   Result Value Ref Range    WBC 0-2 /hpf    RBC  (A) /hpf    Bacteria Few (A) None /hpf    Epithelial Cells 0-2 0 - 5 /hpf    Urine Casts 0 0 - 2 /lpf   CBC WITH DIFFERENTIAL    Collection Time: 05/03/25  8:08 AM   Result Value Ref Range    WBC 4.1 (L) 4.8 - 10.8 K/uL    RBC 3.40 (L) 4.70 - 6.10 M/uL    Hemoglobin 11.3 (L) 14.0 - 18.0 g/dL    Hematocrit 34.5 (L) 42.0 - 52.0 %    .5 (H) 81.4 - 97.8 fL    MCH 33.2 (H) 27.0 - 33.0 pg    MCHC 32.8 32.3 - 36.5 g/dL    .2 (H) 35.9 - 50.0 fL    Platelet Count 111 (L) 164 - 446 K/uL    MPV 11.2 9.0 - 12.9 fL    Neutrophils-Polys 68.10 44.00 - 72.00 %    Lymphocytes 17.20 (L) 22.00 - 41.00 %    Monocytes 11.20 0.00 - 13.40 %    Eosinophils 0.00 0.00 - 6.90 %    Basophils 0.90 0.00 - 1.80 %    Nucleated RBC 1.70 (H) 0.00 - 0.20 /100 WBC    Neutrophils (Absolute) 2.79 1.82 - 7.42 K/uL    Lymphs (Absolute) 0.71 (L) 1.00 - 4.80 K/uL    Monos (Absolute) 0.46 0.00 - 0.85 K/uL    Eos (Absolute) 0.00 0.00 - 0.51 K/uL    Baso (Absolute) 0.04 0.00 - 0.12 K/uL    NRBC (Absolute) 0.07 K/uL    Hypochromia 1+     Anisocytosis 1+     Macrocytosis 1+     Microcytosis 1+    Comp Metabolic Panel    Collection Time: 05/03/25  8:08 AM   Result Value Ref Range    Sodium 137 135 - 145 mmol/L    Potassium 4.3 3.6 - 5.5 mmol/L     Chloride 103 96 - 112 mmol/L    Co2 22 20 - 33 mmol/L    Anion Gap 12.0 7.0 - 16.0    Glucose 153 (H) 65 - 99 mg/dL    Bun 16 8 - 22 mg/dL    Creatinine 0.94 0.50 - 1.40 mg/dL    Calcium 8.9 8.5 - 10.5 mg/dL    Correct Calcium 8.3 (L) 8.5 - 10.5 mg/dL    AST(SGOT) 13 12 - 45 U/L    ALT(SGPT) 17 2 - 50 U/L    Alkaline Phosphatase 79 30 - 99 U/L    Total Bilirubin 1.3 0.1 - 1.5 mg/dL    Albumin 4.7 3.2 - 4.9 g/dL    Total Protein 6.9 6.0 - 8.2 g/dL    Globulin 2.2 1.9 - 3.5 g/dL    A-G Ratio 2.1 g/dL   PT/INR    Collection Time: 05/03/25  8:08 AM   Result Value Ref Range    PT 13.9 12.0 - 14.6 sec    INR 1.04 0.87 - 1.13   PTT    Collection Time: 05/03/25  8:08 AM   Result Value Ref Range    APTT 230.5 (HH) 24.7 - 36.0 sec   ESTIMATED GFR    Collection Time: 05/03/25  8:08 AM   Result Value Ref Range    GFR (CKD-EPI) 88 >60 mL/min/1.73 m 2   DIFFERENTIAL MANUAL    Collection Time: 05/03/25  8:08 AM   Result Value Ref Range    Metamyelocytes 1.70 %    Myelocytes 0.90 %    Manual Diff Status PERFORMED    PLATELET ESTIMATE    Collection Time: 05/03/25  8:08 AM   Result Value Ref Range    Plt Estimation Decreased    MORPHOLOGY    Collection Time: 05/03/25  8:08 AM   Result Value Ref Range    RBC Morphology Present     Polychromia 1+     Poikilocytosis 2+     Ovalocytes 1+     Schistocytes 1+     Tear Drop Cells 1+     Acanthocytes 1+      RADIOLOGY/PROCEDURES   My preliminary interpretation is as follows: N/A    Radiologist interpretation:  No orders to display     COURSE & MEDICAL DECISION MAKING    ASSESSMENT, COURSE AND PLAN  Care Narrative: This is a 70-year-old male with a history of prostate cancer who is anticoagulated now here with urinary retention.    Differential diagnosis includes, but is not limited to, neoplasm, BPH, clot.    Arrives afebrile with normal vital signs.  Appears well-hydrated and nontoxic.  His abdomen is soft but there is suprapubic fullness and tenderness.    Patient was given a dose of  parenteral pain medication and a 20Fr catheter was placed with a large volume of urine drained. CBI was started. He had immediate relief of abdominal pain following bladder drainage.    CBC with pancytopenia consistent with prior, platelets are at 111. Metabolic panel with normal electrolytes, bicarb, anion gap. UA reveals nitrites, trace leukocytes, and few bacteria. He was given a dose of ceftriaxone.    I spoke with Dr. Aguiar, urology, and he concurs with current plan. No indication for additional imaging per urology. Patient was seen by urology PA, Fletcher Davis. He hand irrigated the bladder without any clots dislodged. Urine has run clear. He concurs with outpatient keflex. Patient will need to follow up in 5 days for voiding trial. I asked the patient to discuss his plavix use with cardiology and have placed a referral to neurology on his behalf so he can discuss plavix with them as well given his history of TIAs.     Patient is comfortable and agrees with plan. He was ultimately discharged in good and stable condition with strict return precautions.    ADDITIONAL PROBLEMS MANAGED  None    DISPOSITION AND DISCUSSIONS  I have discussed management of the patient with the following physicians and ROBERT's:  Dr. Aguiar, urology.    Discussion of management with other QHP or appropriate source(s): None     Escalation of care considered, and ultimately not performed:the patient was evaluated by Fletcher Davis, Urology PA, after discussion I have recommended the patient to be discharged    Barriers to care at this time, including but not limited to:  None .     Decision tools and prescription drugs considered including, but not limited to: Antibiotics keflex .    FINAL DIAGNOSIS  1. Urinary retention    2. Acute UTI    3. Hematuria, unspecified type      Electronically signed by: Pedro Toussaint M.D., 5/3/2025 7:07 AM

## 2025-05-03 NOTE — ED NOTES
3 way oh placed. Pt tolerated well. Dark urine returned. Minor clots present. No andrés blood or large clots noted. Pt tolerated well.

## 2025-05-03 NOTE — ED NOTES
Itz from Lab called with critical result of PTT at 230.5. Critical lab result read back to Itz.   Dr. Toussaint notified of critical lab result at 918.  Critical lab result read back by Dr. Toussaint.

## 2025-05-03 NOTE — ED NOTES
Pt cleared for d/c  dischg instructions given to pt  verbally understands aware that Rx keflex was sent to listed pharmacy  he is to  and take as directed    instructed to f/u w/ urology this week   oh cat left in place per order   pt d/c'ed to home in NAD   aware on how to care for oh per pt

## 2025-05-03 NOTE — ED NOTES
Dr Davis was at   irrigated pt's oh cath w/ 1500 cc sterile water  pt tolerated procedure well   impending d/c after observation of output per urology

## 2025-05-03 NOTE — ED TRIAGE NOTES
"Chief Complaint   Patient presents with    Difficulty Urinating     Pt states he has been unable to urinate since 12:30 am, pt believes he has a blood clot left in his bladder from a prior surgery.     /70   Pulse 72   Temp 36.1 °C (96.9 °F) (Temporal)   Resp 18   Ht 1.778 m (5' 10\")   Wt 68.8 kg (151 lb 10.8 oz)   SpO2 98%   BMI 21.76 kg/m²     "

## 2025-05-03 NOTE — CONSULTS
UROLOGY Consult Note:    FREEMAN Keith  Date & Time note created:    5/3/2025   10:25 AM       Patient ID:   Name:             Abebe Yin   YOB: 1955  Age:                 70 y.o.  male   MRN:               [id number]                                                             Reason for Consult:      Gross hematuria    History of Present Illness:    This is a 70 year old male who is known to [facility] as he has a history of BPH, prostate cancer, s/p radiation therapy.  More recently, he has had intermittent gross hematuria, and is s/p cystoscopy clot evacuation and TURP 02/14/2025.  He presented to the ED due to intermittent and worsening hematuria. He reports this leading to difficult voiding overnight, to the point where he felt like he was unable to void at all. Since presenting to the ED a oh catheter was placed, CBI started. Output has cleared with CBI. He does have a CT scan from April 2023 which did not show any renal masses. His UA showed blood, was positive for nitrites, leuks, and showed few bacteria on microscopic urinalysis. At the time of the consult he denies fevers, chills, nausea, vomiting. Denies flank pain, abdominal pain.       Review of Systems:      Constitutional: See HPI  Eyes: Denies changes in vision, no eye pain  Ears/Nose/Throat/Mouth: Denies nasal congestion or sore throat   Cardiovascular: no chest pain, no palpitations   Respiratory: no shortness of breath , Denies cough  Gastrointestinal/Hepatic: See HPI   Genitourinary:  See HPI  Musculoskeletal/Rheum: Denies  joint pain and swelling, no edema  Skin: Denies rash  Neurological: Denies headache, confusion, memory loss or focal weakness/parasthesias  Psychiatric: denies mood disorder   Endocrine: Rosa thyroid problems  Heme/Oncology/Lymph Nodes: Denies enlarged lymph nodes, denies brusing or known bleeding disorder  All other systems were reviewed and are negative (AMA/CMS criteria)                 Past Medical History:   Past Medical History:   Diagnosis Date    CAD (coronary artery disease)     Cancer (HCC)     prostate, bone marrow, prostate and skin    Diabetes (HCC)     Hypertension     Stroke (HCC)      There are no active hospital problems to display for this patient.      Past Surgical History:  No past surgical history on file.    Hospital Medications:  No current facility-administered medications for this encounter.    Current Outpatient Medications:     alfuzosin (UROXATRAL) 10 MG SR tablet, Take 10 mg by mouth every day., Disp: , Rfl:     finasteride (PROSCAR) 5 MG Tab, Take 5 mg by mouth every day., Disp: , Rfl:     metoprolol tartrate (LOPRESSOR) 25 MG Tab, Take 25 mg by mouth 2 times a day., Disp: , Rfl:     atorvastatin (LIPITOR) 20 MG Tab, Take 20 mg by mouth at bedtime., Disp: , Rfl:     clopidogrel (PLAVIX) 75 MG Tab, Take 75 mg by mouth every morning., Disp: , Rfl:     metformin (GLUCOPHAGE) 1000 MG tablet, Take 1,000 mg by mouth 2 times a day with meals., Disp: , Rfl:     potassium citrate SR (UROCIT-K SR) 10 MEQ (1080 MG) Tab CR, Take 10 mEq by mouth 2 times a day., Disp: , Rfl:     meloxicam (MOBIC) 15 MG tablet, Take 15 mg by mouth at bedtime., Disp: , Rfl:     Current Outpatient Medications:  (Not in a hospital admission)      Medication Allergy:  No Known Allergies    Family History:  No family history on file.    Social History:  Social History     Socioeconomic History    Marital status: Legally      Spouse name: Not on file    Number of children: Not on file    Years of education: Not on file    Highest education level: Not on file   Occupational History    Not on file   Tobacco Use    Smoking status: Former     Types: Cigarettes    Smokeless tobacco: Never   Vaping Use    Vaping status: Never Used   Substance and Sexual Activity    Alcohol use: Not Currently    Drug use: Not Currently    Sexual activity: Not on file   Other Topics Concern    Not on file   Social  "History Narrative    Not on file     Social Drivers of Health     Financial Resource Strain: Low Risk  (10/14/2022)    [facility] [employer] [employer]    Financial Resource Strain     Difficulty of Paying Living Expenses: Not on file     Access to Reliable Phone: Not on file   Food Insecurity: Not on file   Transportation Needs: Not on file   Physical Activity: Not on file   Stress: Not on file   Social Connections: Not on file   Intimate Partner Violence: Unknown (10/14/2022)    [facility] [employer] [employer]    Intimate Partner Violence     Fear of Current or Ex-Partner: Not on file     Emotionally Abused: Not on file     Physically Abused: Not on file     Sexually Abused: Not on file     Questions Apply to Other Individual: Not on file   Housing Stability: Not on file         Physical Exam:  Vitals/ General Appearance:   Weight/BMI: Body mass index is 21.76 kg/m².  BP (!) 173/74   Pulse 61   Temp 36.4 °C (97.6 °F) (Temporal)   Resp 16   Ht 1.778 m (5' 10\")   Wt 68.8 kg (151 lb 10.8 oz)   SpO2 97%   Vitals:    05/03/25 0656 05/03/25 0659 05/03/25 0803 05/03/25 0940   BP:  139/70 136/62 (!) 173/74   Pulse:  72 64 61   Resp:  18  16   Temp:  36.1 °C (96.9 °F)  36.4 °C (97.6 °F)   TempSrc:  Temporal  Temporal   SpO2:  98% 97% 97%   Weight: 68.8 kg (151 lb 10.8 oz)      Height: 1.778 m (5' 10\")        Oxygen Therapy:  Pulse Oximetry: 97 %, O2 Delivery Device: None - Room Air    Constitutional:   No acute distress  HENMT:  Normocephalic, Atraumatic  Eyes:  EOMI  Neck:  Normal range of motion  Lungs:  Normal respiratory effort  Abdomen: Soft, No tenderness, No guarding, No rebound tenderness  : No CVAT. Marcial with clear yellow output on low flow CBI  Skin: Warm, Dry  Neurologic: Alert & oriented x 3  Psychiatric: Affect normal, Judgment normal, Mood normal.      MDM (Data Review):     Records reviewed and summarized in current documentation    Lab Data " Review:  Recent Results (from the past 24 hours)   URINALYSIS,CULTURE IF INDICATED    Collection Time: 05/03/25  7:35 AM    Specimen: Urine, Clean Catch   Result Value Ref Range    Color Brown (A)     Character Cloudy (A)     Specific Gravity >=1.030 <1.035    Ph 6.5 5.0 - 8.0    Glucose Negative Negative mg/dL    Ketones Trace (A) Negative mg/dL    Protein >=300 (A) Negative mg/dL    Bilirubin Moderate (A) Negative    Urobilinogen, Urine 1.0 <=1.0 EU/dL    Nitrite Positive (A) Negative    Leukocyte Esterase Trace (A) Negative    Occult Blood Large (A) Negative    Micro Urine Req Microscopic    URINE MICROSCOPIC (W/UA)    Collection Time: 05/03/25  7:35 AM   Result Value Ref Range    WBC 0-2 /hpf    RBC  (A) /hpf    Bacteria Few (A) None /hpf    Epithelial Cells 0-2 0 - 5 /hpf    Urine Casts 0 0 - 2 /lpf   CBC WITH DIFFERENTIAL    Collection Time: 05/03/25  8:08 AM   Result Value Ref Range    WBC 4.1 (L) 4.8 - 10.8 K/uL    RBC 3.40 (L) 4.70 - 6.10 M/uL    Hemoglobin 11.3 (L) 14.0 - 18.0 g/dL    Hematocrit 34.5 (L) 42.0 - 52.0 %    .5 (H) 81.4 - 97.8 fL    MCH 33.2 (H) 27.0 - 33.0 pg    MCHC 32.8 32.3 - 36.5 g/dL    .2 (H) 35.9 - 50.0 fL    Platelet Count 111 (L) 164 - 446 K/uL    MPV 11.2 9.0 - 12.9 fL    Neutrophils-Polys 68.10 44.00 - 72.00 %    Lymphocytes 17.20 (L) 22.00 - 41.00 %    Monocytes 11.20 0.00 - 13.40 %    Eosinophils 0.00 0.00 - 6.90 %    Basophils 0.90 0.00 - 1.80 %    Nucleated RBC 1.70 (H) 0.00 - 0.20 /100 WBC    Neutrophils (Absolute) 2.79 1.82 - 7.42 K/uL    Lymphs (Absolute) 0.71 (L) 1.00 - 4.80 K/uL    Monos (Absolute) 0.46 0.00 - 0.85 K/uL    Eos (Absolute) 0.00 0.00 - 0.51 K/uL    Baso (Absolute) 0.04 0.00 - 0.12 K/uL    NRBC (Absolute) 0.07 K/uL    Hypochromia 1+     Anisocytosis 1+     Macrocytosis 1+     Microcytosis 1+    Comp Metabolic Panel    Collection Time: 05/03/25  8:08 AM   Result Value Ref Range    Sodium 137 135 - 145 mmol/L    Potassium 4.3 3.6 - 5.5 mmol/L     Chloride 103 96 - 112 mmol/L    Co2 22 20 - 33 mmol/L    Anion Gap 12.0 7.0 - 16.0    Glucose 153 (H) 65 - 99 mg/dL    Bun 16 8 - 22 mg/dL    Creatinine 0.94 0.50 - 1.40 mg/dL    Calcium 8.9 8.5 - 10.5 mg/dL    Correct Calcium 8.3 (L) 8.5 - 10.5 mg/dL    AST(SGOT) 13 12 - 45 U/L    ALT(SGPT) 17 2 - 50 U/L    Alkaline Phosphatase 79 30 - 99 U/L    Total Bilirubin 1.3 0.1 - 1.5 mg/dL    Albumin 4.7 3.2 - 4.9 g/dL    Total Protein 6.9 6.0 - 8.2 g/dL    Globulin 2.2 1.9 - 3.5 g/dL    A-G Ratio 2.1 g/dL   PT/INR    Collection Time: 05/03/25  8:08 AM   Result Value Ref Range    PT 13.9 12.0 - 14.6 sec    INR 1.04 0.87 - 1.13   PTT    Collection Time: 05/03/25  8:08 AM   Result Value Ref Range    APTT 230.5 (HH) 24.7 - 36.0 sec   ESTIMATED GFR    Collection Time: 05/03/25  8:08 AM   Result Value Ref Range    GFR (CKD-EPI) 88 >60 mL/min/1.73 m 2   DIFFERENTIAL MANUAL    Collection Time: 05/03/25  8:08 AM   Result Value Ref Range    Metamyelocytes 1.70 %    Myelocytes 0.90 %    Manual Diff Status PERFORMED    PLATELET ESTIMATE    Collection Time: 05/03/25  8:08 AM   Result Value Ref Range    Plt Estimation Decreased    MORPHOLOGY    Collection Time: 05/03/25  8:08 AM   Result Value Ref Range    RBC Morphology Present     Polychromia 1+     Poikilocytosis 2+     Ovalocytes 1+     Schistocytes 1+     Tear Drop Cells 1+     Acanthocytes 1+        Imaging/Procedures Review:    Reviewed    MDM (Assessment and Plan):       70 year old male with a history of BPH, prostate cancer, s/p radiation therapy.  Now presenting with worsening hematuria. 22 3 way catheter placed and CBI started.    Procedure: At bedside I irrigated with about 750ml of sterile water. This was done until irrigant in and out was clear. No clots noted. Output remaining clear at the end of the irrigation.    Plan:  - case and plan of care discussed with Dr. Aguiar.  - patient received rocephin in ED. Recommend discharge on empiric antibiotics.Urine sent for  culture; will follow up on culture results  - output remains clear with irrigation. He does not have evidence of blood clots. Hematuria could likely be related to infection which has now improved with irrigation. Urology recommends to discharge with oh catheter in place and we will arrange outpatient follow up sometime next week for voiding trial after he has been on antibiotics.  - no further acute urologic intervention. Urology signing off.      Fletcher Davis, P.A.   [id number]  [place] [id number]   [phone]

## 2025-05-03 NOTE — DISCHARGE INSTRUCTIONS
You were seen in the ER for urinary retention and blood in your urine.  Thankfully this resolved after we placed a catheter.  Your urine does appear to be infected and you received a dose of antibiotics in the ER.  I am sending you home with a prescription for antibiotics, please take these as directed.  Please follow-up with your urologist in 5 days for recheck.  At that time they will do a trial to see if you can urinate on your own and hopefully remove the catheter.  If you develop any new or worsening symptoms please return immediately to the ER.  I hope you feel better soon!

## 2025-05-04 ENCOUNTER — HOSPITAL ENCOUNTER (EMERGENCY)
Facility: MEDICAL CENTER | Age: 70
End: 2025-05-04
Attending: EMERGENCY MEDICINE
Payer: MEDICARE

## 2025-05-04 VITALS
OXYGEN SATURATION: 96 % | HEIGHT: 70 IN | SYSTOLIC BLOOD PRESSURE: 155 MMHG | WEIGHT: 151.68 LBS | DIASTOLIC BLOOD PRESSURE: 88 MMHG | BODY MASS INDEX: 21.71 KG/M2 | HEART RATE: 89 BPM | RESPIRATION RATE: 18 BRPM | TEMPERATURE: 97.9 F

## 2025-05-04 DIAGNOSIS — T83.9XXA PROBLEM WITH FOLEY CATHETER, INITIAL ENCOUNTER (HCC): ICD-10-CM

## 2025-05-04 DIAGNOSIS — R33.9 URINARY RETENTION: ICD-10-CM

## 2025-05-04 PROCEDURE — 99284 EMERGENCY DEPT VISIT MOD MDM: CPT

## 2025-05-04 PROCEDURE — 51702 INSERT TEMP BLADDER CATH: CPT

## 2025-05-04 PROCEDURE — 303105 HCHG CATHETER EXTRA

## 2025-05-04 ASSESSMENT — FIBROSIS 4 INDEX: FIB4 SCORE: 1.93

## 2025-05-04 NOTE — ED PROVIDER NOTES
ED Provider Note    CHIEF COMPLAINT  Chief Complaint   Patient presents with    Urinary Catheter Problem     Pt was seen yesterday morning for urinary retention, a catheter was placed and pt was discharged home. Per pt the catheter is only darning when he stands up and the urine is coming out around the catheter. Pt is very uncomfortable        EXTERNAL RECORDS REVIEWED  Other ED records reviewed: Patient was seen in the emergency department earlier today with urinary retention.  He has a history of prostate cancer.  A 20 Haitian catheter was placed, CBI was started.  Urology was consulted.  He was discharged with urology follow-up and a course of Keflex.    HPI/ROS  LIMITATION TO HISTORY   Select: : None  OUTSIDE HISTORIAN(S):  None    Abebe Yin is a 70 y.o. male with history of prostate cancer, CAD on Eliquis, diabetes mellitus, hypertension who presents to the emergency department for evaluation of urinary catheter pain.  He was seen in the emergency department earlier this morning with urinary retention.  A 20F Marcial catheter was placed.  He received CBI in the ED, and urology was consulted.  He was discharged with urology follow-up and a course of Keflex.  After being discharged, he developed a urinary catheter pain.  The catheter is not draining very well.  He is spilling urine around the catheter, but not into the catheter bag.  No fever or chills.    PAST MEDICAL HISTORY   has a past medical history of CAD (coronary artery disease), Cancer (HCC), Diabetes (HCC), Hypertension, and Stroke (HCC).    SURGICAL HISTORY  patient denies any surgical history    FAMILY HISTORY  No family history on file.    SOCIAL HISTORY  Social History     Tobacco Use    Smoking status: Former     Types: Cigarettes    Smokeless tobacco: Never   Vaping Use    Vaping status: Never Used   Substance and Sexual Activity    Alcohol use: Not Currently    Drug use: Not Currently    Sexual activity: Not on file       CURRENT  "MEDICATIONS  Home Medications       Reviewed by Olman Street R.N. (Registered Nurse) on 05/04/25 at 0038  Med List Status: Partial     Medication Last Dose Status   alfuzosin (UROXATRAL) 10 MG SR tablet  Active   atorvastatin (LIPITOR) 20 MG Tab  Active   cephALEXin (KEFLEX) 500 MG Cap  Active   clopidogrel (PLAVIX) 75 MG Tab  Active   finasteride (PROSCAR) 5 MG Tab  Active   meloxicam (MOBIC) 15 MG tablet  Active   metformin (GLUCOPHAGE) 1000 MG tablet  Active   metoprolol tartrate (LOPRESSOR) 25 MG Tab  Active   potassium citrate SR (UROCIT-K SR) 10 MEQ (1080 MG) Tab CR  Active                    ALLERGIES  No Known Allergies    PHYSICAL EXAM  VITAL SIGNS: BP (!) 155/88   Pulse 89   Temp 36.6 °C (97.9 °F) (Temporal)   Resp 18   Ht 1.778 m (5' 10\")   Wt 68.8 kg (151 lb 10.8 oz)   SpO2 96%   BMI 21.76 kg/m²    General: Well-appearing, no acute distress.   Eyes: EOM grossly intact BL.  HENT: MMM.   Neck: Normal ROM.   Lungs: Non-labored breathing.   Cardiac: Regular rate and rhythm.   Abdomen: Soft, non-tender, non-distended. No rebound or guarding.    : Marcial catheter in place.  No testicular swelling or erythema.  MSK: Symmetric movement of all extremities.  Skin: No rashes, lesions, bruising, or petechiae. Well-perfused.   Neuro: Grossly nonfocal neurologic exam. Face symmetric. Normal mentation.     EKG/LABS  None     RADIOLOGY/PROCEDURES   I have independently interpreted the diagnostic imaging associated with this visit and am waiting the final reading from the radiologist.     My preliminary interpretation is as follows: None    Radiologist interpretation:  No orders to display     COURSE & MEDICAL DECISION MAKING    ASSESSMENT, COURSE AND PLAN  Care Narrative:   Abebe Yin is a 70 y.o. male with history of prostate cancer, CAD on Eliquis, diabetes mellitus, hypertension who presents to the emergency department for evaluation of urinary catheter pain.  He was seen in the emergency " department earlier this morning with urinary retention.  A 20F Marcial catheter was placed and he received CBI.  He was discharged with urology follow-up, and a course of Keflex for UTI.    0045:  On my initial assessment, ABCs are intact.  He is hemodynamically stable and afebrile.  His abdomen is soft, nondistended, and nontender.  Marcial catheter in place.  Minimal urine output in the catheter bag with small clots.  Patient wants the current Marcial removed.  He thinks it is too large.  The Marcial catheter was removed.  He was able to pass a small amount of urine spontaneously, however postvoid residual volume is 300mL.  I explained that we likely need to replace the Marcial catheter with another 20F since he is passing clots, but patient wants to try a smaller catheter.  Plan to try an 18F.      0300: 18F easily passed with 400mL urine output.  Urine is slightly pink in appearance, no clots, however patient states this is as clear as his urine has looked in many days.  He feels much better.  He would like to be discharged.  I considered advanced imaging, such as a CT scan, but deemed unnecessary at this time.  He has follow-up with his urologist on 5/8.  I reviewed strict return precautions, all of his questions were answered, and he was discharged in stable condition.    ADDITIONAL PROBLEMS MANAGED  N/A    DISPOSITION AND DISCUSSIONS  I have discussed management of the patient with the following physicians and ROBERT's:  None    Discussion of management with other [facility] or appropriate source(s): None     Escalation of care considered, and ultimately not performed:Laboratory analysis, diagnostic imaging, and acute inpatient care management, however at this time, the patient is most appropriate for outpatient management    Barriers to care at this time, including but not limited to:  None .     Decision tools and prescription drugs considered including, but not limited to:  None .    FINAL DIAGNOSIS  1. Problem with Marcial  catheter, initial encounter (HCC) Acute   2. Urinary retention Acute        Electronically signed by: Josette Navarro D.O., 5/4/2025 12:40 AM

## 2025-05-04 NOTE — ED TRIAGE NOTES
"Chief Complaint   Patient presents with    Urinary Catheter Problem     Pt was seen yesterday morning for urinary retention, a catheter was placed and pt was discharged home. Per pt the catheter is only darning when he stands up and the urine is coming out around the catheter. Pt is very uncomfortable      BP (!) 155/88   Pulse 98   Temp 36.6 °C (97.9 °F) (Temporal)   Resp (!) 22   Ht 1.778 m (5' 10\")   Wt 68.8 kg (151 lb 10.8 oz)   SpO2 96%   BMI 21.76 kg/m²       "

## 2025-05-04 NOTE — DISCHARGE INSTRUCTIONS
Will place a Marcial catheter with an 18F.  Please follow-up with the urologist on 5/8.  If in the meantime you develop any worsening symptoms or issues with the Marcial, please return to the emergency department.

## 2025-05-04 NOTE — ED NOTES
Pt able to urinate with several blood clots seen. Pt provided with more water. Pt wants to urinate one more time prior to bladder scan

## 2025-05-04 NOTE — ED NOTES
Pt catheter replaced with 18fr. Pt stable at this time. 400mL relieved from bladder. Leg bag in place at this time. Pt updated on POC. No further needs at this time.

## 2025-05-05 ENCOUNTER — APPOINTMENT (OUTPATIENT)
Dept: RADIOLOGY | Facility: MEDICAL CENTER | Age: 70
DRG: 669 | End: 2025-05-05
Attending: UROLOGY
Payer: MEDICARE

## 2025-05-05 ENCOUNTER — ANESTHESIA (OUTPATIENT)
Dept: SURGERY | Facility: MEDICAL CENTER | Age: 70
DRG: 669 | End: 2025-05-05
Payer: MEDICARE

## 2025-05-05 ENCOUNTER — HOSPITAL ENCOUNTER (INPATIENT)
Facility: MEDICAL CENTER | Age: 70
LOS: 1 days | DRG: 669 | End: 2025-05-06
Attending: EMERGENCY MEDICINE | Admitting: INTERNAL MEDICINE
Payer: MEDICARE

## 2025-05-05 ENCOUNTER — ANESTHESIA EVENT (OUTPATIENT)
Dept: SURGERY | Facility: MEDICAL CENTER | Age: 70
DRG: 669 | End: 2025-05-05
Payer: MEDICARE

## 2025-05-05 ENCOUNTER — APPOINTMENT (OUTPATIENT)
Dept: RADIOLOGY | Facility: MEDICAL CENTER | Age: 70
DRG: 669 | End: 2025-05-05
Payer: MEDICARE

## 2025-05-05 DIAGNOSIS — R33.9 URINARY RETENTION: ICD-10-CM

## 2025-05-05 DIAGNOSIS — R31.9 HEMATURIA, UNSPECIFIED TYPE: ICD-10-CM

## 2025-05-05 PROBLEM — C61 PROSTATE CANCER (HCC): Status: ACTIVE | Noted: 2025-05-05

## 2025-05-05 PROBLEM — D69.6 THROMBOCYTOPENIA (HCC): Status: RESOLVED | Noted: 2025-05-05 | Resolved: 2025-05-05

## 2025-05-05 PROBLEM — Z90.79 S/P TURP (STATUS POST TRANSURETHRAL RESECTION OF PROSTATE): Status: ACTIVE | Noted: 2025-05-05

## 2025-05-05 PROBLEM — D69.6 THROMBOCYTOPENIA (HCC): Status: ACTIVE | Noted: 2025-05-05

## 2025-05-05 PROBLEM — D53.9 MACROCYTIC ANEMIA: Status: ACTIVE | Noted: 2025-05-05

## 2025-05-05 PROBLEM — Z86.73 HISTORY OF CVA (CEREBROVASCULAR ACCIDENT): Status: ACTIVE | Noted: 2025-05-05

## 2025-05-05 PROBLEM — N13.9 URINARY OBSTRUCTION: Status: ACTIVE | Noted: 2025-05-05

## 2025-05-05 PROBLEM — N40.0 BPH (BENIGN PROSTATIC HYPERPLASIA): Status: ACTIVE | Noted: 2025-05-05

## 2025-05-05 LAB
ALBUMIN SERPL BCP-MCNC: 4.9 G/DL (ref 3.2–4.9)
ALBUMIN/GLOB SERPL: 1.9 G/DL
ALP SERPL-CCNC: 90 U/L (ref 30–99)
ALT SERPL-CCNC: 17 U/L (ref 2–50)
ANION GAP SERPL CALC-SCNC: 15 MMOL/L (ref 7–16)
ANISOCYTOSIS BLD QL SMEAR: ABNORMAL
APTT PPP: 235.3 SEC (ref 24.7–36)
AST SERPL-CCNC: 14 U/L (ref 12–45)
BASOPHILS # BLD AUTO: 0 % (ref 0–1.8)
BASOPHILS # BLD: 0 K/UL (ref 0–0.12)
BILIRUB SERPL-MCNC: 1.5 MG/DL (ref 0.1–1.5)
BUN SERPL-MCNC: 19 MG/DL (ref 8–22)
CALCIUM ALBUM COR SERPL-MCNC: 8.9 MG/DL (ref 8.5–10.5)
CALCIUM SERPL-MCNC: 9.6 MG/DL (ref 8.5–10.5)
CFT BLD TEG: >17 MIN (ref 4.6–9.1)
CFT P HPASE BLD TEG: >17 MIN (ref 4.3–8.3)
CHLORIDE SERPL-SCNC: 102 MMOL/L (ref 96–112)
CLOT ANGLE BLD TEG: 40 DEGREES (ref 63–78)
CO2 SERPL-SCNC: 21 MMOL/L (ref 20–33)
CREAT SERPL-MCNC: 1.03 MG/DL (ref 0.5–1.4)
CT.EXTRINSIC BLD ROTEM: 4.9 MIN (ref 0.8–2.1)
DACRYOCYTES BLD QL SMEAR: NORMAL
EOSINOPHIL # BLD AUTO: 0.08 K/UL (ref 0–0.51)
EOSINOPHIL NFR BLD: 1.7 % (ref 0–6.9)
ERYTHROCYTE [DISTWIDTH] IN BLOOD BY AUTOMATED COUNT: 102.7 FL (ref 35.9–50)
GFR SERPLBLD CREATININE-BSD FMLA CKD-EPI: 79 ML/MIN/1.73 M 2
GLOBULIN SER CALC-MCNC: 2.6 G/DL (ref 1.9–3.5)
GLUCOSE BLD STRIP.AUTO-MCNC: 100 MG/DL (ref 65–99)
GLUCOSE SERPL-MCNC: 140 MG/DL (ref 65–99)
HCT VFR BLD AUTO: 35.6 % (ref 42–52)
HGB BLD-MCNC: 11.8 G/DL (ref 14–18)
HYPOCHROMIA BLD QL SMEAR: ABNORMAL
INR PPP: 1.06 (ref 0.87–1.13)
LG PLATELETS BLD QL SMEAR: NORMAL
LYMPHOCYTES # BLD AUTO: 1.07 K/UL (ref 1–4.8)
LYMPHOCYTES NFR BLD: 22.2 % (ref 22–41)
MACROCYTES BLD QL SMEAR: ABNORMAL
MAGNESIUM SERPL-MCNC: 2 MG/DL (ref 1.5–2.5)
MANUAL DIFF BLD: NORMAL
MCF BLD TEG: 52.9 MM (ref 52–69)
MCF.PLATELET INHIB BLD ROTEM: 19.2 MM (ref 15–32)
MCH RBC QN AUTO: 33.6 PG (ref 27–33)
MCHC RBC AUTO-ENTMCNC: 33.1 G/DL (ref 32.3–36.5)
MCV RBC AUTO: 101.4 FL (ref 81.4–97.8)
MICROCYTES BLD QL SMEAR: ABNORMAL
MONOCYTES # BLD AUTO: 0.61 K/UL (ref 0–0.85)
MONOCYTES NFR BLD AUTO: 12.8 % (ref 0–13.4)
NEUTROPHILS # BLD AUTO: 3.04 K/UL (ref 1.82–7.42)
NEUTROPHILS NFR BLD: 63.3 % (ref 44–72)
NRBC # BLD AUTO: 0.04 K/UL
NRBC BLD-RTO: 0.8 /100 WBC (ref 0–0.2)
OVALOCYTES BLD QL SMEAR: NORMAL
PA AA BLD-ACNC: ABNORMAL % (ref 0–11)
PA ADP BLD-ACNC: ABNORMAL % (ref 0–17)
PLATELET # BLD AUTO: 108 K/UL (ref 164–446)
PLATELET BLD QL SMEAR: NORMAL
PMV BLD AUTO: 11.4 FL (ref 9–12.9)
POIKILOCYTOSIS BLD QL SMEAR: NORMAL
POLYCHROMASIA BLD QL SMEAR: NORMAL
POTASSIUM SERPL-SCNC: 4.2 MMOL/L (ref 3.6–5.5)
PROT SERPL-MCNC: 7.5 G/DL (ref 6–8.2)
PROTHROMBIN TIME: 14.1 SEC (ref 12–14.6)
RBC # BLD AUTO: 3.51 M/UL (ref 4.7–6.1)
RBC BLD AUTO: PRESENT
SCHISTOCYTES BLD QL SMEAR: NORMAL
SODIUM SERPL-SCNC: 138 MMOL/L (ref 135–145)
TEG ALGORITHM TGALG: ABNORMAL
WBC # BLD AUTO: 4.8 K/UL (ref 4.8–10.8)

## 2025-05-05 PROCEDURE — 85027 COMPLETE CBC AUTOMATED: CPT

## 2025-05-05 PROCEDURE — 76775 US EXAM ABDO BACK WALL LIM: CPT

## 2025-05-05 PROCEDURE — 0TCC8ZZ EXTIRPATION OF MATTER FROM BLADDER NECK, VIA NATURAL OR ARTIFICIAL OPENING ENDOSCOPIC: ICD-10-PCS | Performed by: UROLOGY

## 2025-05-05 PROCEDURE — 88307 TISSUE EXAM BY PATHOLOGIST: CPT | Performed by: PATHOLOGY

## 2025-05-05 PROCEDURE — 700105 HCHG RX REV CODE 258: Performed by: EMERGENCY MEDICINE

## 2025-05-05 PROCEDURE — 99223 1ST HOSP IP/OBS HIGH 75: CPT | Mod: AI | Performed by: INTERNAL MEDICINE

## 2025-05-05 PROCEDURE — 94760 N-INVAS EAR/PLS OXIMETRY 1: CPT

## 2025-05-05 PROCEDURE — 83735 ASSAY OF MAGNESIUM: CPT

## 2025-05-05 PROCEDURE — 160036 HCHG PACU - EA ADDL 30 MINS PHASE I: Performed by: UROLOGY

## 2025-05-05 PROCEDURE — 85347 COAGULATION TIME ACTIVATED: CPT

## 2025-05-05 PROCEDURE — 85245 CLOT FACTOR VIII VW RISTOCTN: CPT

## 2025-05-05 PROCEDURE — 0TBC8ZZ EXCISION OF BLADDER NECK, VIA NATURAL OR ARTIFICIAL OPENING ENDOSCOPIC: ICD-10-PCS | Performed by: UROLOGY

## 2025-05-05 PROCEDURE — 85730 THROMBOPLASTIN TIME PARTIAL: CPT

## 2025-05-05 PROCEDURE — 85240 CLOT FACTOR VIII AHG 1 STAGE: CPT

## 2025-05-05 PROCEDURE — 82962 GLUCOSE BLOOD TEST: CPT

## 2025-05-05 PROCEDURE — 0T5C8ZZ DESTRUCTION OF BLADDER NECK, VIA NATURAL OR ARTIFICIAL OPENING ENDOSCOPIC: ICD-10-PCS | Performed by: UROLOGY

## 2025-05-05 PROCEDURE — 80053 COMPREHEN METABOLIC PANEL: CPT

## 2025-05-05 PROCEDURE — 81240 F2 GENE: CPT

## 2025-05-05 PROCEDURE — 85576 BLOOD PLATELET AGGREGATION: CPT

## 2025-05-05 PROCEDURE — 160039 HCHG SURGERY MINUTES - EA ADDL 1 MIN LEVEL 3: Performed by: UROLOGY

## 2025-05-05 PROCEDURE — 85384 FIBRINOGEN ACTIVITY: CPT

## 2025-05-05 PROCEDURE — 84597 ASSAY OF VITAMIN K: CPT

## 2025-05-05 PROCEDURE — 85303 CLOT INHIBIT PROT C ACTIVITY: CPT

## 2025-05-05 PROCEDURE — 88307 TISSUE EXAM BY PATHOLOGIST: CPT | Mod: 26 | Performed by: PATHOLOGY

## 2025-05-05 PROCEDURE — 99285 EMERGENCY DEPT VISIT HI MDM: CPT

## 2025-05-05 PROCEDURE — 85007 BL SMEAR W/DIFF WBC COUNT: CPT

## 2025-05-05 PROCEDURE — 81241 F5 GENE: CPT

## 2025-05-05 PROCEDURE — 770001 HCHG ROOM/CARE - MED/SURG/GYN PRIV*

## 2025-05-05 PROCEDURE — 160009 HCHG ANES TIME/MIN: Performed by: UROLOGY

## 2025-05-05 PROCEDURE — 700105 HCHG RX REV CODE 258: Performed by: UROLOGY

## 2025-05-05 PROCEDURE — 36415 COLL VENOUS BLD VENIPUNCTURE: CPT

## 2025-05-05 PROCEDURE — 85246 CLOT FACTOR VIII VW ANTIGEN: CPT

## 2025-05-05 PROCEDURE — 85300 ANTITHROMBIN III ACTIVITY: CPT

## 2025-05-05 PROCEDURE — 160048 HCHG OR STATISTICAL LEVEL 1-5: Performed by: UROLOGY

## 2025-05-05 PROCEDURE — 700101 HCHG RX REV CODE 250: Performed by: ANESTHESIOLOGY

## 2025-05-05 PROCEDURE — 160015 HCHG STAT PREOP MINUTES: Performed by: UROLOGY

## 2025-05-05 PROCEDURE — 160028 HCHG SURGERY MINUTES - 1ST 30 MINS LEVEL 3: Performed by: UROLOGY

## 2025-05-05 PROCEDURE — 160002 HCHG RECOVERY MINUTES (STAT): Performed by: UROLOGY

## 2025-05-05 PROCEDURE — 700105 HCHG RX REV CODE 258: Performed by: INTERNAL MEDICINE

## 2025-05-05 PROCEDURE — 700102 HCHG RX REV CODE 250 W/ 637 OVERRIDE(OP): Performed by: INTERNAL MEDICINE

## 2025-05-05 PROCEDURE — A9270 NON-COVERED ITEM OR SERVICE: HCPCS | Performed by: INTERNAL MEDICINE

## 2025-05-05 PROCEDURE — 85610 PROTHROMBIN TIME: CPT

## 2025-05-05 PROCEDURE — 85306 CLOT INHIBIT PROT S FREE: CPT

## 2025-05-05 PROCEDURE — 700111 HCHG RX REV CODE 636 W/ 250 OVERRIDE (IP): Performed by: ANESTHESIOLOGY

## 2025-05-05 PROCEDURE — 700111 HCHG RX REV CODE 636 W/ 250 OVERRIDE (IP): Mod: JZ | Performed by: ANESTHESIOLOGY

## 2025-05-05 PROCEDURE — 160035 HCHG PACU - 1ST 60 MINS PHASE I: Performed by: UROLOGY

## 2025-05-05 RX ORDER — HYDRALAZINE HYDROCHLORIDE 20 MG/ML
5 INJECTION INTRAMUSCULAR; INTRAVENOUS
Status: DISCONTINUED | OUTPATIENT
Start: 2025-05-05 | End: 2025-05-05 | Stop reason: HOSPADM

## 2025-05-05 RX ORDER — CEFAZOLIN SODIUM 1 G/3ML
INJECTION, POWDER, FOR SOLUTION INTRAMUSCULAR; INTRAVENOUS PRN
Status: DISCONTINUED | OUTPATIENT
Start: 2025-05-05 | End: 2025-05-05 | Stop reason: SURG

## 2025-05-05 RX ORDER — AMOXICILLIN 250 MG
2 CAPSULE ORAL EVERY EVENING
Status: DISCONTINUED | OUTPATIENT
Start: 2025-05-05 | End: 2025-05-06 | Stop reason: HOSPADM

## 2025-05-05 RX ORDER — MIDAZOLAM HYDROCHLORIDE 1 MG/ML
INJECTION INTRAMUSCULAR; INTRAVENOUS PRN
Status: DISCONTINUED | OUTPATIENT
Start: 2025-05-05 | End: 2025-05-05 | Stop reason: SURG

## 2025-05-05 RX ORDER — SODIUM CHLORIDE, SODIUM LACTATE, POTASSIUM CHLORIDE, CALCIUM CHLORIDE 600; 310; 30; 20 MG/100ML; MG/100ML; MG/100ML; MG/100ML
INJECTION, SOLUTION INTRAVENOUS CONTINUOUS
Status: ACTIVE | OUTPATIENT
Start: 2025-05-05 | End: 2025-05-05

## 2025-05-05 RX ORDER — CLOPIDOGREL BISULFATE 75 MG/1
75 TABLET ORAL EVERY MORNING
Status: DISCONTINUED | OUTPATIENT
Start: 2025-05-05 | End: 2025-05-06 | Stop reason: HOSPADM

## 2025-05-05 RX ORDER — EPHEDRINE SULFATE 50 MG/ML
5 INJECTION, SOLUTION INTRAVENOUS
Status: DISCONTINUED | OUTPATIENT
Start: 2025-05-05 | End: 2025-05-05 | Stop reason: HOSPADM

## 2025-05-05 RX ORDER — HYDROMORPHONE HYDROCHLORIDE 1 MG/ML
0.1 INJECTION, SOLUTION INTRAMUSCULAR; INTRAVENOUS; SUBCUTANEOUS
Status: DISCONTINUED | OUTPATIENT
Start: 2025-05-05 | End: 2025-05-05 | Stop reason: HOSPADM

## 2025-05-05 RX ORDER — OXYCODONE HYDROCHLORIDE 5 MG/1
2.5 TABLET ORAL
Refills: 0 | Status: DISCONTINUED | OUTPATIENT
Start: 2025-05-05 | End: 2025-05-06 | Stop reason: HOSPADM

## 2025-05-05 RX ORDER — METOPROLOL TARTRATE 25 MG/1
25 TABLET, FILM COATED ORAL 2 TIMES DAILY
Status: DISCONTINUED | OUTPATIENT
Start: 2025-05-05 | End: 2025-05-06 | Stop reason: HOSPADM

## 2025-05-05 RX ORDER — HYDROMORPHONE HYDROCHLORIDE 1 MG/ML
0.4 INJECTION, SOLUTION INTRAMUSCULAR; INTRAVENOUS; SUBCUTANEOUS
Status: DISCONTINUED | OUTPATIENT
Start: 2025-05-05 | End: 2025-05-05 | Stop reason: HOSPADM

## 2025-05-05 RX ORDER — OXYCODONE HCL 5 MG/5 ML
10 SOLUTION, ORAL ORAL
Status: DISCONTINUED | OUTPATIENT
Start: 2025-05-05 | End: 2025-05-05 | Stop reason: HOSPADM

## 2025-05-05 RX ORDER — ONDANSETRON 2 MG/ML
4 INJECTION INTRAMUSCULAR; INTRAVENOUS
Status: DISCONTINUED | OUTPATIENT
Start: 2025-05-05 | End: 2025-05-05 | Stop reason: HOSPADM

## 2025-05-05 RX ORDER — HYDROMORPHONE HYDROCHLORIDE 1 MG/ML
0.2 INJECTION, SOLUTION INTRAMUSCULAR; INTRAVENOUS; SUBCUTANEOUS
Status: DISCONTINUED | OUTPATIENT
Start: 2025-05-05 | End: 2025-05-05 | Stop reason: HOSPADM

## 2025-05-05 RX ORDER — ALBUTEROL SULFATE 5 MG/ML
2.5 SOLUTION RESPIRATORY (INHALATION)
Status: DISCONTINUED | OUTPATIENT
Start: 2025-05-05 | End: 2025-05-05 | Stop reason: HOSPADM

## 2025-05-05 RX ORDER — ONDANSETRON 4 MG/1
4 TABLET, ORALLY DISINTEGRATING ORAL EVERY 4 HOURS PRN
Status: DISCONTINUED | OUTPATIENT
Start: 2025-05-05 | End: 2025-05-06 | Stop reason: HOSPADM

## 2025-05-05 RX ORDER — HYDROMORPHONE HYDROCHLORIDE 1 MG/ML
0.25 INJECTION, SOLUTION INTRAMUSCULAR; INTRAVENOUS; SUBCUTANEOUS
Status: DISCONTINUED | OUTPATIENT
Start: 2025-05-05 | End: 2025-05-06 | Stop reason: HOSPADM

## 2025-05-05 RX ORDER — DEXAMETHASONE SODIUM PHOSPHATE 4 MG/ML
INJECTION, SOLUTION INTRA-ARTICULAR; INTRALESIONAL; INTRAMUSCULAR; INTRAVENOUS; SOFT TISSUE PRN
Status: DISCONTINUED | OUTPATIENT
Start: 2025-05-05 | End: 2025-05-05 | Stop reason: SURG

## 2025-05-05 RX ORDER — TAMSULOSIN HYDROCHLORIDE 0.4 MG/1
0.4 CAPSULE ORAL DAILY
Status: DISCONTINUED | OUTPATIENT
Start: 2025-05-05 | End: 2025-05-06 | Stop reason: HOSPADM

## 2025-05-05 RX ORDER — HYDRALAZINE HYDROCHLORIDE 20 MG/ML
10 INJECTION INTRAMUSCULAR; INTRAVENOUS EVERY 4 HOURS PRN
Status: DISCONTINUED | OUTPATIENT
Start: 2025-05-05 | End: 2025-05-06 | Stop reason: HOSPADM

## 2025-05-05 RX ORDER — LIDOCAINE HYDROCHLORIDE 20 MG/ML
INJECTION, SOLUTION EPIDURAL; INFILTRATION; INTRACAUDAL; PERINEURAL PRN
Status: DISCONTINUED | OUTPATIENT
Start: 2025-05-05 | End: 2025-05-05 | Stop reason: SURG

## 2025-05-05 RX ORDER — ONDANSETRON 2 MG/ML
4 INJECTION INTRAMUSCULAR; INTRAVENOUS EVERY 4 HOURS PRN
Status: DISCONTINUED | OUTPATIENT
Start: 2025-05-05 | End: 2025-05-06 | Stop reason: HOSPADM

## 2025-05-05 RX ORDER — POLYETHYLENE GLYCOL 3350 17 G/17G
1 POWDER, FOR SOLUTION ORAL
Status: DISCONTINUED | OUTPATIENT
Start: 2025-05-05 | End: 2025-05-06 | Stop reason: HOSPADM

## 2025-05-05 RX ORDER — SODIUM CHLORIDE, SODIUM LACTATE, POTASSIUM CHLORIDE, AND CALCIUM CHLORIDE .6; .31; .03; .02 G/100ML; G/100ML; G/100ML; G/100ML
1000 INJECTION, SOLUTION INTRAVENOUS ONCE
Status: COMPLETED | OUTPATIENT
Start: 2025-05-05 | End: 2025-05-05

## 2025-05-05 RX ORDER — FINASTERIDE 5 MG/1
5 TABLET, FILM COATED ORAL DAILY
Status: DISCONTINUED | OUTPATIENT
Start: 2025-05-05 | End: 2025-05-06 | Stop reason: HOSPADM

## 2025-05-05 RX ORDER — OXYCODONE HCL 5 MG/5 ML
5 SOLUTION, ORAL ORAL
Status: DISCONTINUED | OUTPATIENT
Start: 2025-05-05 | End: 2025-05-05 | Stop reason: HOSPADM

## 2025-05-05 RX ORDER — DIPHENHYDRAMINE HYDROCHLORIDE 50 MG/ML
12.5 INJECTION, SOLUTION INTRAMUSCULAR; INTRAVENOUS
Status: DISCONTINUED | OUTPATIENT
Start: 2025-05-05 | End: 2025-05-05 | Stop reason: HOSPADM

## 2025-05-05 RX ORDER — SODIUM CHLORIDE, SODIUM LACTATE, POTASSIUM CHLORIDE, CALCIUM CHLORIDE 600; 310; 30; 20 MG/100ML; MG/100ML; MG/100ML; MG/100ML
INJECTION, SOLUTION INTRAVENOUS CONTINUOUS
Status: DISCONTINUED | OUTPATIENT
Start: 2025-05-05 | End: 2025-05-05 | Stop reason: HOSPADM

## 2025-05-05 RX ORDER — ACETAMINOPHEN 325 MG/1
650 TABLET ORAL EVERY 6 HOURS PRN
Status: DISCONTINUED | OUTPATIENT
Start: 2025-05-05 | End: 2025-05-06 | Stop reason: HOSPADM

## 2025-05-05 RX ORDER — ATORVASTATIN CALCIUM 20 MG/1
20 TABLET, FILM COATED ORAL
Status: DISCONTINUED | OUTPATIENT
Start: 2025-05-05 | End: 2025-05-06 | Stop reason: HOSPADM

## 2025-05-05 RX ORDER — HALOPERIDOL 5 MG/ML
1 INJECTION INTRAMUSCULAR
Status: DISCONTINUED | OUTPATIENT
Start: 2025-05-05 | End: 2025-05-05 | Stop reason: HOSPADM

## 2025-05-05 RX ORDER — OXYCODONE HYDROCHLORIDE 5 MG/1
5 TABLET ORAL
Refills: 0 | Status: DISCONTINUED | OUTPATIENT
Start: 2025-05-05 | End: 2025-05-06 | Stop reason: HOSPADM

## 2025-05-05 RX ORDER — METOPROLOL TARTRATE 1 MG/ML
1 INJECTION, SOLUTION INTRAVENOUS
Status: DISCONTINUED | OUTPATIENT
Start: 2025-05-05 | End: 2025-05-05 | Stop reason: HOSPADM

## 2025-05-05 RX ORDER — SODIUM CHLORIDE 9 MG/ML
INJECTION, SOLUTION INTRAVENOUS CONTINUOUS
Status: ACTIVE | OUTPATIENT
Start: 2025-05-05 | End: 2025-05-06

## 2025-05-05 RX ADMIN — FENTANYL CITRATE 25 MCG: 50 INJECTION, SOLUTION INTRAMUSCULAR; INTRAVENOUS at 14:24

## 2025-05-05 RX ADMIN — FINASTERIDE 5 MG: 5 TABLET, FILM COATED ORAL at 18:18

## 2025-05-05 RX ADMIN — SODIUM CHLORIDE, POTASSIUM CHLORIDE, SODIUM LACTATE AND CALCIUM CHLORIDE: 600; 310; 30; 20 INJECTION, SOLUTION INTRAVENOUS at 14:08

## 2025-05-05 RX ADMIN — LIDOCAINE HYDROCHLORIDE 50 MG: 20 INJECTION, SOLUTION EPIDURAL; INFILTRATION; INTRACAUDAL; PERINEURAL at 14:46

## 2025-05-05 RX ADMIN — PROPOFOL 10 MG: 10 INJECTION, EMULSION INTRAVENOUS at 14:25

## 2025-05-05 RX ADMIN — SODIUM CHLORIDE: 9 INJECTION, SOLUTION INTRAVENOUS at 20:12

## 2025-05-05 RX ADMIN — OXYCODONE 5 MG: 5 TABLET ORAL at 18:18

## 2025-05-05 RX ADMIN — CEFAZOLIN 2 G: 1 INJECTION, POWDER, FOR SOLUTION INTRAMUSCULAR; INTRAVENOUS at 14:17

## 2025-05-05 RX ADMIN — FENTANYL CITRATE 25 MCG: 50 INJECTION, SOLUTION INTRAMUSCULAR; INTRAVENOUS at 14:36

## 2025-05-05 RX ADMIN — MIDAZOLAM HYDROCHLORIDE 2 MG: 1 INJECTION, SOLUTION INTRAMUSCULAR; INTRAVENOUS at 14:11

## 2025-05-05 RX ADMIN — TAMSULOSIN HYDROCHLORIDE 0.4 MG: 0.4 CAPSULE ORAL at 18:18

## 2025-05-05 RX ADMIN — DEXAMETHASONE SODIUM PHOSPHATE 4 MG: 4 INJECTION INTRA-ARTICULAR; INTRALESIONAL; INTRAMUSCULAR; INTRAVENOUS; SOFT TISSUE at 14:37

## 2025-05-05 RX ADMIN — PROPOFOL 10 MG: 10 INJECTION, EMULSION INTRAVENOUS at 14:36

## 2025-05-05 RX ADMIN — SODIUM CHLORIDE, POTASSIUM CHLORIDE, SODIUM LACTATE AND CALCIUM CHLORIDE 1000 ML: 600; 310; 30; 20 INJECTION, SOLUTION INTRAVENOUS at 07:00

## 2025-05-05 RX ADMIN — FENTANYL CITRATE 25 MCG: 50 INJECTION, SOLUTION INTRAMUSCULAR; INTRAVENOUS at 14:29

## 2025-05-05 RX ADMIN — PROPOFOL 130 MG: 10 INJECTION, EMULSION INTRAVENOUS at 14:13

## 2025-05-05 RX ADMIN — FENTANYL CITRATE 50 MCG: 50 INJECTION, SOLUTION INTRAMUSCULAR; INTRAVENOUS at 15:45

## 2025-05-05 RX ADMIN — METOPROLOL TARTRATE 25 MG: 25 TABLET, FILM COATED ORAL at 20:09

## 2025-05-05 RX ADMIN — LIDOCAINE HYDROCHLORIDE 50 MG: 20 INJECTION, SOLUTION EPIDURAL; INFILTRATION; INTRACAUDAL; PERINEURAL at 14:13

## 2025-05-05 RX ADMIN — ATORVASTATIN CALCIUM 20 MG: 20 TABLET, FILM COATED ORAL at 20:09

## 2025-05-05 RX ADMIN — FENTANYL CITRATE 25 MCG: 50 INJECTION, SOLUTION INTRAMUSCULAR; INTRAVENOUS at 14:26

## 2025-05-05 SDOH — ECONOMIC STABILITY: TRANSPORTATION INSECURITY
IN THE PAST 12 MONTHS, HAS THE LACK OF TRANSPORTATION KEPT YOU FROM MEDICAL APPOINTMENTS OR FROM GETTING MEDICATIONS?: NO

## 2025-05-05 SDOH — ECONOMIC STABILITY: TRANSPORTATION INSECURITY
IN THE PAST 12 MONTHS, HAS LACK OF RELIABLE TRANSPORTATION KEPT YOU FROM MEDICAL APPOINTMENTS, MEETINGS, WORK OR FROM GETTING THINGS NEEDED FOR DAILY LIVING?: NO

## 2025-05-05 ASSESSMENT — COGNITIVE AND FUNCTIONAL STATUS - GENERAL
DAILY ACTIVITIY SCORE: 24
MOBILITY SCORE: 24
SUGGESTED CMS G CODE MODIFIER MOBILITY: CH
SUGGESTED CMS G CODE MODIFIER DAILY ACTIVITY: CH

## 2025-05-05 ASSESSMENT — PAIN DESCRIPTION - PAIN TYPE
TYPE: SURGICAL PAIN
TYPE: ACUTE PAIN
TYPE: ACUTE PAIN
TYPE: SURGICAL PAIN

## 2025-05-05 ASSESSMENT — SOCIAL DETERMINANTS OF HEALTH (SDOH)
WITHIN THE PAST 12 MONTHS, YOU WORRIED THAT YOUR FOOD WOULD RUN OUT BEFORE YOU GOT THE MONEY TO BUY MORE: NEVER TRUE
IN THE PAST 12 MONTHS, HAS THE ELECTRIC, GAS, OIL, OR WATER COMPANY THREATENED TO SHUT OFF SERVICE IN YOUR HOME?: NO
WITHIN THE LAST YEAR, HAVE YOU BEEN KICKED, HIT, SLAPPED, OR OTHERWISE PHYSICALLY HURT BY YOUR PARTNER OR EX-PARTNER?: NO
WITHIN THE PAST 12 MONTHS, THE FOOD YOU BOUGHT JUST DIDN'T LAST AND YOU DIDN'T HAVE MONEY TO GET MORE: NEVER TRUE
WITHIN THE LAST YEAR, HAVE YOU BEEN HUMILIATED OR EMOTIONALLY ABUSED IN OTHER WAYS BY YOUR PARTNER OR EX-PARTNER?: NO
WITHIN THE LAST YEAR, HAVE TO BEEN RAPED OR FORCED TO HAVE ANY KIND OF SEXUAL ACTIVITY BY YOUR PARTNER OR EX-PARTNER?: NO
WITHIN THE LAST YEAR, HAVE YOU BEEN AFRAID OF YOUR PARTNER OR EX-PARTNER?: NO

## 2025-05-05 ASSESSMENT — LIFESTYLE VARIABLES
HAVE PEOPLE ANNOYED YOU BY CRITICIZING YOUR DRINKING: NO
HAVE YOU EVER FELT YOU SHOULD CUT DOWN ON YOUR DRINKING: NO
TOTAL SCORE: 0
ON A TYPICAL DAY WHEN YOU DRINK ALCOHOL HOW MANY DRINKS DO YOU HAVE: 0
TOTAL SCORE: 0
AVERAGE NUMBER OF DAYS PER WEEK YOU HAVE A DRINK CONTAINING ALCOHOL: 0
TOTAL SCORE: 0
CONSUMPTION TOTAL: NEGATIVE
HOW MANY TIMES IN THE PAST YEAR HAVE YOU HAD 5 OR MORE DRINKS IN A DAY: 0
ALCOHOL_USE: NO
EVER FELT BAD OR GUILTY ABOUT YOUR DRINKING: NO
EVER HAD A DRINK FIRST THING IN THE MORNING TO STEADY YOUR NERVES TO GET RID OF A HANGOVER: NO

## 2025-05-05 ASSESSMENT — ENCOUNTER SYMPTOMS
CHILLS: 0
ABDOMINAL PAIN: 0
FLANK PAIN: 0
VOMITING: 0
NAUSEA: 0
FEVER: 0

## 2025-05-05 ASSESSMENT — FIBROSIS 4 INDEX
FIB4 SCORE: 1.93
FIB4 SCORE: 2.14

## 2025-05-05 NOTE — ED PROVIDER NOTES
ED Provider Note      ED PHYSICIAN NOTE    CHIEF COMPLAINT  Chief Complaint   Patient presents with    Urinary Catheter Problem     Pt has been seen in the ER for the same 5/4 reports it gets clogged       EXTERNAL RECORDS REVIEWED  Outpatient Notes from urology February 27, 2025 for urinary retention, history of hematuria, kidney stone, patient was also seen in the emergency department 2 days ago and yesterday for urinary retention and hematuria, he had catheter placed with CBI noted pancytopenia platelets 111 as well started on Keflex, returned yesterday with poorly draining catheter and had his catheter downsized to 18 [place]    HPI/ROS  LIMITATION TO HISTORY   Select: : None  OUTSIDE HISTORIAN(S):  none    Abebe Yin is a 70 y.o. male who presents with a nondraining Marcial catheter.  Patient reports symptoms really initially began back in February after surgery or has had some intermittent bleeding since.  However 3 days ago he had urinary retention had Marcial catheter placed.  He has had return to the hospital every day since with a clogged catheter.  He reports that while he was having blood in the catheter at last night it seemed to be draining however woke up this morning and it has not drained and he is starting to have some suprapubic pain as well.  He reports no other abdominal pain, no nausea or vomiting, no fevers or chills.  No other symptoms at this point.  He does take Plavix daily    PAST MEDICAL HISTORY  Past Medical History:   Diagnosis Date    CAD (coronary artery disease)     Cancer (HCC)     prostate, bone marrow, prostate and skin    Diabetes (HCC)     Hypertension     Stroke (HCC)        SOCIAL HISTORY  Social History     Tobacco Use    Smoking status: Former     Types: Cigarettes    Smokeless tobacco: Never   Vaping Use    Vaping status: Never Used   Substance Use Topics    Alcohol use: Not Currently    Drug use: Not Currently       CURRENT MEDICATIONS  Home Medications        "Reviewed by Kayy Alarcon PhT (Pharmacy Tech) on 05/05/25 at 0822  Med List Status: Complete     Medication Last Dose Status   alfuzosin (UROXATRAL) 10 MG SR tablet 5/4/2025 Active   atorvastatin (LIPITOR) 20 MG Tab 5/4/2025 Active   cephALEXin (KEFLEX) 500 MG Cap 5/4/2025 Active   clopidogrel (PLAVIX) 75 MG Tab 5/4/2025 Active   finasteride (PROSCAR) 5 MG Tab 5/4/2025 Active   meloxicam (MOBIC) 15 MG tablet 5/4/2025 Active   metformin (GLUCOPHAGE) 1000 MG tablet 5/4/2025 Active   metoprolol tartrate (LOPRESSOR) 25 MG Tab 5/4/2025 Active   potassium citrate SR (UROCIT-K SR) 10 MEQ (1080 MG) Tab CR 5/4/2025 Active                    ALLERGIES  No Known Allergies    PHYSICAL EXAM  VITAL SIGNS: /60   Pulse 63   Temp 36.4 °C (97.6 °F) (Temporal)   Resp 16   Ht 1.778 m (5' 10\")   Wt 67.4 kg (148 lb 9.4 oz)   SpO2 96%   BMI 21.32 kg/m²    Constitutional: Awake and alert uncomfortable  HENT: Normal inspection, no signs of trauma  Eyes: Normal inspection, Pupils equal, non-icteric  Neck: Grossly normal range of motion. No stridor  Cardiovascular: Regular rate and rhythm, no murmurs.   Thorax & Lungs: No respiratory distress, No wheezing, No rales, No rhonchi, No chest tenderness.   Abdomen:  soft, suprapubic tenderness with mild distention noted nontender, no mass  Skin: No obvious rash. Warm. Dry.   Back: No tenderness, No CVA tenderness.   Extremities: No cyanosis, no edema  Neurologic: AO3, Grossly normal,   Psychiatric: Normal affect for situation        DIAGNOSTIC STUDIES / PROCEDURES  LABS/EKG  Labs Reviewed   CBC WITH DIFFERENTIAL - Abnormal; Notable for the following components:       Result Value    RBC 3.51 (*)     Hemoglobin 11.8 (*)     Hematocrit 35.6 (*)     .4 (*)     MCH 33.6 (*)     .7 (*)     Platelet Count 108 (*)     Nucleated RBC 0.80 (*)     Anisocytosis 2+ (*)     All other components within normal limits   COMP METABOLIC PANEL - Abnormal; Notable for the following " components:    Glucose 140 (*)     All other components within normal limits   APTT - Abnormal; Notable for the following components:    APTT 235.3 (*)     All other components within normal limits   PROTHROMBIN TIME   ESTIMATED GFR   DIFFERENTIAL MANUAL   PLATELET ESTIMATE   MORPHOLOGY   PLATELET MAPPING WITH BASIC TEG   URINALYSIS   MAGNESIUM               COURSE & MEDICAL DECISION MAKING    INITIAL ASSESSMENT, COURSE AND PLAN  Care Narrative: 6:07 AM  Patient presents with what appears to be clogged Marcial likely with clot.  Additional consideration for obstructive nephropathy, electrolyte or metabolic derangement, anemia, seems less likely urinary tract infection given his prior evaluations and symptoms.  Have ordered for diagnostic labs and will plan for CBI.  I will contact urology although anticipate this patient may need to be admitted further monitoring and irrigation given he has had 3 visits every day with clogged Marcial    Case is discussed with Dr. Marte from urology, given the patient's recurrent obstruction hematuria will plan for hospitalization.  Patient may need cystoscopy or other intervention so we request that the patient be kept n.p.o., additionally will need to hold the patient's Plavix    The patient is reevaluated updated on all results and plan and he is agreeable    Case is discussed with hospitalist for admission    Interventions  Medications   NS infusion (has no administration in time range)   acetaminophen (Tylenol) tablet 650 mg (has no administration in time range)   Pharmacy Consult Request ...Pain Management Review 1 Each (has no administration in time range)   oxyCODONE immediate-release (Roxicodone) tablet 2.5 mg (has no administration in time range)     Or   oxyCODONE immediate-release (Roxicodone) tablet 5 mg (has no administration in time range)     Or   HYDROmorphone (Dilaudid) injection 0.25 mg (has no administration in time range)   senna-docusate (Pericolace Or Senokot  S) 8.6-50 MG per tablet 2 Tablet (has no administration in time range)     And   polyethylene glycol/lytes (Miralax) Packet 1 Packet (has no administration in time range)   hydrALAZINE (Apresoline) injection 10 mg (has no administration in time range)   ondansetron (Zofran) syringe/vial injection 4 mg (has no administration in time range)     Or   ondansetron (Zofran ODT) dispertab 4 mg (has no administration in time range)   LR (Bolus) infusion 1,000 mL (1,000 mL Intravenous New Bag 5/5/25 0700)       Measures  Hydration: Based on the patient's presentation of Inability to take oral fluids the patient was given IV fluids. IV Hydration was used because oral hydration was not as rapid as required. Upon recheck following hydration, the patient was improved.       PROBLEM LIST  This is a pleasant 70-year-old male presenting with    # Urinary retention and andrés hematuria.  Patient with prior history of BPH, prostate cancer, status post TURP and has had some intermittent hematuria since however over the last 3 days andrés hematuria and recurrent urinary retention.  He initially had Marcial placed 3 days ago with irrigation returned yesterday and then again today with the same problem.  Has been irrigated now it has cleared although with his recurrent symptoms on obstruction will be admitted with urology consultation for ongoing care.  Patient will be kept n.p.o., hold Plavix as above    # History of prostate cancer status postradiation    # History of CAD      DISPOSITION AND DISCUSSIONS  I have discussed management of the patient with the following physicians and ROBERT's:  as noted above    Patient is admitted in stable condition    FINAL DIAGNOSIS  1. Urinary retention        2. Hematuria, unspecified type               This dictation was created using voice recognition software. The accuracy of the dictation is limited to the abilities of the software. I expect there may be some errors of grammar and possibly content. The  nursing notes were reviewed and certain aspects of this information were incorporated into this note.    Electronically signed by: Wilfredo Frausto M.D., 5/5/2025

## 2025-05-05 NOTE — ASSESSMENT & PLAN NOTE
"\"Hx of prostate cancer   Sp EBRt  C/b radiation cystitis   Outpatient fu\"    5/6: We have consulted hematology/oncology to the patient's pancytopenia and elevated APTT, we appreciate further recommendations.  "

## 2025-05-05 NOTE — ASSESSMENT & PLAN NOTE
"5/6: Patient underwent: \" Cystoscopy with evacuation of clot, transurethral resection of bladder neck, fulguration of bleeding\" yesterday by urology. We appreciate further recommendations.  "

## 2025-05-05 NOTE — ASSESSMENT & PLAN NOTE
"\"Sp recent turp  Cw home meds\"    5/6: Patient underwent: \" Cystoscopy with evacuation of clot, transurethral resection of bladder neck, fulguration of bleeding\" yesterday by urology. We appreciate further recommendations.   "

## 2025-05-05 NOTE — ANESTHESIA PREPROCEDURE EVALUATION
Case: [id number] Date/Time: 05/05/25 1350    Procedure: CYSTOSCOPY/CLOT EVACUATION    Location: [facility] OR 04 / SURGERY [facility]    Surgeons: Alonzo Monahan M.D.          Hz MI x 2.  Says recent [facility] med scan shows now heart damage  CVA-on plavix  DM2- metformin  HTN  Bleeding in bladder, anemia  Quit tobacco 6 years  Relevant Problems   CARDIAC   (positive) CAD (coronary artery disease)       Physical Exam    Airway   Mallampati: II  TM distance: >3 FB  Neck ROM: full       Cardiovascular - normal exam  Rhythm: regular  Rate: normal  (-) murmur     Dental - normal exam           Pulmonary - normal exam  Breath sounds clear to auscultation     Abdominal    Neurological - normal exam                   Anesthesia Plan    ASA 3   ASA physical status 3 criteria: MI or angina - history (> 3 months), a thrombophilic disease requiring anticoagulation and CVA or TIA - history (> 3 months)    Plan - general       Airway plan will be LMA          Induction: intravenous    Postoperative Plan: Postoperative administration of opioids is intended.    Pertinent diagnostic labs and testing reviewed    Informed Consent:    Anesthetic plan and risks discussed with patient.    Use of blood products discussed with: patient whom consented to blood products.

## 2025-05-05 NOTE — ED TRIAGE NOTES
"Chief Complaint   Patient presents with    Urinary Catheter Problem     Pt has been seen in the ER for the same 5/4 reports it gets clogged     /66   Pulse 68   Temp 36.4 °C (97.5 °F) (Temporal)   Resp 20   Ht 1.778 m (5' 10\")   Wt 67.4 kg (148 lb 9.4 oz)   SpO2 97%   BMI 21.32 kg/m²     "

## 2025-05-05 NOTE — ED NOTES
VS re-checked. IVF continue to infuse.  Bladder Irrigation in place and oh draining bloody urine - UA not sent at this time due to bladder irrigation running. Additional warm blankets provided. Call light within reach.

## 2025-05-05 NOTE — ED NOTES
Medication history reviewed with pt. Med rec is complete.  Allergies reviewed, per pt    Pt started KEFLEX 500MG on 4/3/2025 for 5 day course, last dose was taken on 5/5/2025 at 0200.    Pt is not on any anticoagulants      Dispense history available in EPIC? NO

## 2025-05-05 NOTE — H&P
Hospital Medicine History & Physical Note    Date of Service  5/5/2025    Primary Care Physician  ELMO Guallpa.    Consultants  urology    Specialist Names: Dr. Marte    Code Status  Full Code    Chief Complaint  Chief Complaint   Patient presents with    Urinary Catheter Problem     Pt has been seen in the ER for the same 5/4 reports it gets clogged       History of Presenting Illness  71 yo with history of prostate cancer sp EBRT, c/b gross hematuria and radiation cystitis, admitted in Feb 2025 for cystoscopy, clot evacuation/fulguration and TURP (2/14/25) who presents with clogged oh catheter. Pt states since his surgeries in mid February he's had significant pain with urinating and ongoing hematuria. Since  5/3 presented with worsening hematuria, difficulty urinating where he had a oh placed, oh irrigated and discharged with urology fu. He then returned next day on 5/4 with same complaints and once again had manual irrigation and sent home. He once again returns on 5/5 stating he has suprapubic pain, no oh output. Manual irrigation done in ER and oh bag at time of interview was full of dark red urine with clots. Renal US w/o hydronephrosis. He has been on and off plavix and still was taking at time of admission, reports of stroke.     I discussed the plan of care with patient and bedside RN.    Review of Systems  Review of Systems   All other systems reviewed and are negative.      Past Medical History   has a past medical history of CAD (coronary artery disease), Cancer (HCC), Diabetes (HCC), Hypertension, and Stroke (HCC).    Surgical History   has no past surgical history on file.     Family History  family history is not on file.   Family history reviewed with patient. There is no family history that is pertinent to the chief complaint.     Social History   reports that he has quit smoking. His smoking use included cigarettes. He has never used smokeless tobacco. He reports that  he does not currently use alcohol. He reports that he does not currently use drugs.    Allergies  No Known Allergies    Medications  Prior to Admission Medications   Prescriptions Last Dose Informant Patient Reported? Taking?   alfuzosin (UROXATRAL) 10 MG SR tablet 5/4/2025 at  9:00 AM Patient Yes Yes   Sig: Take 10 mg by mouth every day.   atorvastatin (LIPITOR) 20 MG Tab 5/4/2025 at  6:00 PM Patient Yes Yes   Sig: Take 20 mg by mouth at bedtime.   cephALEXin (KEFLEX) 500 MG Cap 5/5/2025 at  2:00 AM Patient No Yes   Sig: Take 1 Capsule by mouth 3 times a day for 5 days.   clopidogrel (PLAVIX) 75 MG Tab 5/4/2025 at  9:00 AM Patient Yes Yes   Sig: Take 75 mg by mouth every morning.   finasteride (PROSCAR) 5 MG Tab 5/4/2025 at  6:00 PM Patient Yes Yes   Sig: Take 5 mg by mouth every day.   meloxicam (MOBIC) 15 MG tablet 5/4/2025 at  6:00 PM Patient Yes Yes   Sig: Take 15 mg by mouth at bedtime.   metformin (GLUCOPHAGE) 1000 MG tablet 5/4/2025 at  6:00 PM Patient Yes Yes   Sig: Take 1,000 mg by mouth 2 times a day with meals.   metoprolol tartrate (LOPRESSOR) 25 MG Tab 5/4/2025 at  6:00 PM Patient Yes Yes   Sig: Take 25 mg by mouth 2 times a day.   potassium citrate SR (UROCIT-K SR) 10 MEQ (1080 MG) Tab CR 5/4/2025 at  6:00 PM Patient Yes Yes   Sig: Take 10 mEq by mouth 2 times a day.      Facility-Administered Medications: None       Physical Exam  Temp:  [36.4 °C (97.5 °F)-36.9 °C (98.4 °F)] 36.9 °C (98.4 °F)  Pulse:  [54-68] 57  Resp:  [14-20] 14  BP: (114-144)/(56-68) 117/58  SpO2:  [94 %-100 %] 94 %  Blood Pressure : 117/58   Temperature: 36.9 °C (98.4 °F)   Pulse: (!) 57   Respiration: 14   Pulse Oximetry: 94 %       Physical Exam  General: tearful, distressed  HEENT: PERRLA, EOMI  Cards: RRR  Pulm: CTA  Abdomen: soft, mild suprapubic tenderness  MSK: normal ROM of upper and lower extremities  Neuro: CN II-XII grossly intact, sensation/strength intact, AAOx3  Psych: Appropriate mood   Laboratory:  Recent Labs      "05/03/25  0808 05/05/25  0655   WBC 4.1* 4.8   RBC 3.40* 3.51*   HEMOGLOBIN 11.3* 11.8*   HEMATOCRIT 34.5* 35.6*   .5* 101.4*   MCH 33.2* 33.6*   MCHC 32.8 33.1   .2* 102.7*   PLATELETCT 111* 108*   MPV 11.2 11.4     Recent Labs     05/03/25  0808 05/05/25  0655   SODIUM 137 138   POTASSIUM 4.3 4.2   CHLORIDE 103 102   CO2 22 21   GLUCOSE 153* 140*   BUN 16 19   CREATININE 0.94 1.03   CALCIUM 8.9 9.6     Recent Labs     05/03/25  0808 05/05/25  0655   ALTSGPT 17 17   ASTSGOT 13 14   ALKPHOSPHAT 79 90   TBILIRUBIN 1.3 1.5   GLUCOSE 153* 140*     Recent Labs     05/03/25  0808 05/05/25  0655   APTT 230.5* 235.3*   INR 1.04 1.06     No results for input(s): \"NTPROBNP\" in the last 72 hours.      No results for input(s): \"TROPONINT\" in the last 72 hours.    Imaging:  US-RENAL   Final Result      1.  Normal appearing kidneys.   2.  Enlarged prostate.      [id number] VIEW    (Results Pending)   DX-PORTABLE FLUOROSCOPY < 1 HOUR Reason For Exam: Intra op/ non reportable    (Results Pending)         Assessment/Plan:  Justification for Admission Status  I anticipate this patient will require at least two midnights for appropriate medical management, necessitating inpatient admission because hematuria    Patient will need a Med/Surg bed on MEDICAL service .  The need is secondary to hematuria/clots/urinary retention.    * Urinary obstruction- (present on admission)  Assessment & Plan  Hospitalized for urinary obstruction from ongoing gross hematuria and likely clots  Urology [place] was consulted and plan is to OR for cystoscopy and possible clot evacuation  Urology has already ordered continuous bladder irrigation  I have ordered pain regimen oxy and IV dilaudid PRN    Elevated partial thromboplastin time (PTT)- (present on admission)  Assessment & Plan  Patient oddly has had a chronically rather elevated PTT  D dimer in the past neg  INR nl  I did sent von willebrand profile, vitamin K level and the " thrombophilia panel  I do think patient should be seen at hematology upon discharge as this is likely contributing to his bleeding problem    BPH (benign prostatic hyperplasia)  Assessment & Plan  Sp recent turp  Cw home meds  Consider holding alfuzosin if contributing to pancytopenia    Macrocytic anemia  Assessment & Plan  Noted   Pt has had hematuria for some time   Despite reporting significant hematuria past 3 days Hb remains at baseline which is 11-12  Trend H/H daily for now  Transfuse for Hb<7  I am concerned that pt may have underlying bleeding DO, fu labs and consider inpatient c/s vs outpatient heme referral    Prostate cancer (HCC)  Assessment & Plan  Hx of prostate cancer   Sp EBRt  C/b radiation cystitis   Outpatient fu    History of CVA (cerebrovascular accident)  Assessment & Plan  Noted  I will hold plavix   I do not see statin on board  Cw statin    Hematuria  Assessment & Plan  Persistent hematuria since his recent cystoscopy/clot evacuation and TURP  Plan as above  CBI per urology  Could have underlying bleeding disorder given the significantly abnormal PTT, work up as above    S/P TURP (status post transurethral resection of prostate)  Assessment & Plan  Noted, performed on 2/14/2025  Per patient still a lot of pain and bleeding  To cystoscopy today    Pancytopenia (HCC)- (present on admission)  Assessment & Plan  From chart review appears chronic  I wonder if this is related to his BPH medicine alfuzosin  Significantly high PTT also concerning for underlying bleeding disorder  I will inquire about this with pharmacy and urology and consider holding it  Lower platelets also likely linked to plavix use  Anemia multifactorial, but at baseline         VTE prophylaxis: SCDs/TEDs    I spent 60 minutes providing care for this patient.  This included face-to-face interview, physical examination.  Review of lab work including CBC, BMP, US imaging. Discussion with multidisciplinary team including case  management, nursing staff and pharmacy

## 2025-05-05 NOTE — ANESTHESIA TIME REPORT
Anesthesia Start and Stop Event Times       Date Time Event    5/5/2025 1404 Ready for Procedure     1408 Anesthesia Start     1456 Anesthesia Stop          Responsible Staff  05/05/25      Name Role Begin End    Rakesh Sanchez M.D. Anesth 1408 1061          Overtime Reason:  no overtime (within assigned shift)    Comments:

## 2025-05-05 NOTE — ASSESSMENT & PLAN NOTE
"\"Patient oddly has had a chronically rather elevated PTT  D dimer in the past neg  INR nl  I did sent von willebrand profile, vitamin K level and the thrombophilia panel\"    5/6: We have consulted hematology, we appreciate further recommendations. They have requested mixing studies, factor VIII, IX, XI and XII levels. I have ordered the studies. We appreciate further recommendations by hematology.   "

## 2025-05-05 NOTE — ASSESSMENT & PLAN NOTE
"\"Pt has had hematuria for some time   Despite reporting significant hematuria past 3 days Hb remains at baseline which is 11-12  Trend H/H daily for now  Transfuse for Hb<7\"    5/6: We have consulted hematology, we appreciate further recommendations.  They have requested mixing studies, factor VIII, IX, XI and XII levels.  I have ordered the studies.  We appreciate further recommendations by hematology.  "

## 2025-05-05 NOTE — ANESTHESIA PROCEDURE NOTES
Airway    Date/Time: 5/5/2025 2:14 PM    Performed by: Rakesh Sanchez M.D.  Authorized by: Rakesh Sanchez M.D.    Location:  OR  Urgency:  Elective  Indications for Airway Management:  Anesthesia      Spontaneous Ventilation: absent    Sedation Level:  Deep  Preoxygenated: Yes    Mask Difficulty Assessment:  0 - not attempted  Final Airway Type:  Supraglottic airway  Final Supraglottic Airway:  Standard LMA    SGA Size:  4  Number of Attempts at Approach:  1

## 2025-05-05 NOTE — OR NURSING
1455: Patient arrived from OR via gurney.    Sedation/Resp Status: Eye opening to verbal.  Respirations spontaneous and non-labored.    HR: 64; VSS on 4L 02 via mask.    1500: FSB    1545: Patient in 7/10 pain, plan to medicate, see MAR.     161: Called report to HOLDEN Jeffries on [facility]. Patient placed on transport.     1630: Patient to [facility] by transport.

## 2025-05-05 NOTE — ASSESSMENT & PLAN NOTE
"Per previous hospitalist:  \"From chart review appears chronic  I wonder if this is related to his BPH medicine alfuzosin but per pharmacy unlikely  Significantly high PTT also concerning for underlying bleeding disorder  Lower platelets also likely linked to plavix use  Anemia multifactorial, but at baseline\"    5/6: We have consulted hematology, we appreciate further recommendations.  They have requested mixing studies, factor VIII, IX, XI and XII levels.  I have ordered the studies.  We appreciate further recommendations by hematology.  "

## 2025-05-05 NOTE — ASSESSMENT & PLAN NOTE
"Noted, performed on 2/14/2025  Per patient still a lot of pain and bleeding    5/6: Patient underwent: \" Cystoscopy with evacuation of clot, transurethral resection of bladder neck, fulguration of bleeding\" yesterday by urology. We appreciate further recommendations.  "

## 2025-05-05 NOTE — ASSESSMENT & PLAN NOTE
"Hospitalized for urinary obstruction from ongoing gross hematuria and likely clots    5/6: Patient underwent: \" Cystoscopy with evacuation of clot, transurethral resection of bladder neck, fulguration of bleeding\" yesterday by urology. We appreciate further recommendations.  "

## 2025-05-05 NOTE — OR SURGEON
Immediate Post OP Note    PreOp Diagnosis: clot retention, prostate cancer      PostOp Diagnosis: same      Procedure(s):  CYSTOSCOPY/CLOT EVACUATION?TUR bladder neck    Surgeon(s):  Alonzo Monahan M.D.    Anesthesiologist/Type of Anesthesia:  Anesthesiologist: Rakesh Sanchez M.D./* No anesthesia type entered *    Surgical Staff:  Circulator: Alize Caldera R.N.  Scrub Person: Francesca Rosenthal    Specimens removed if any:  ID Type Source Tests Collected by Time Destination   A : BLADDER NECK Other Other PATHOLOGY SPECIMEN Alonzo Monahan M.D. 5/5/2025  2:42 PM        Estimated Blood Loss: min    Findings: 100cc clot.  Calcifications at bladder neck for prior resection; XRT changes trigone    Complications: none        5/5/2025 2:46 PM Alonzo Monahan M.D.

## 2025-05-06 VITALS
WEIGHT: 148.59 LBS | HEART RATE: 59 BPM | BODY MASS INDEX: 21.27 KG/M2 | SYSTOLIC BLOOD PRESSURE: 106 MMHG | TEMPERATURE: 98.8 F | DIASTOLIC BLOOD PRESSURE: 52 MMHG | HEIGHT: 70 IN | RESPIRATION RATE: 18 BRPM | OXYGEN SATURATION: 94 %

## 2025-05-06 PROBLEM — E11.9 DIABETES (HCC): Status: ACTIVE | Noted: 2025-05-06

## 2025-05-06 PROBLEM — Z71.89 ADVANCE CARE PLANNING: Status: ACTIVE | Noted: 2025-05-06

## 2025-05-06 LAB
ANION GAP SERPL CALC-SCNC: 12 MMOL/L (ref 7–16)
BUN SERPL-MCNC: 19 MG/DL (ref 8–22)
CALCIUM SERPL-MCNC: 8.3 MG/DL (ref 8.5–10.5)
CHLORIDE SERPL-SCNC: 103 MMOL/L (ref 96–112)
CO2 SERPL-SCNC: 19 MMOL/L (ref 20–33)
CREAT SERPL-MCNC: 0.86 MG/DL (ref 0.5–1.4)
ERYTHROCYTE [DISTWIDTH] IN BLOOD BY AUTOMATED COUNT: 97.6 FL (ref 35.9–50)
GFR SERPLBLD CREATININE-BSD FMLA CKD-EPI: 94 ML/MIN/1.73 M 2
GLUCOSE BLD STRIP.AUTO-MCNC: 175 MG/DL (ref 65–99)
GLUCOSE SERPL-MCNC: 206 MG/DL (ref 65–99)
HCT VFR BLD AUTO: 28.1 % (ref 42–52)
HCT VFR BLD AUTO: 29 % (ref 42–52)
HGB BLD-MCNC: 9.7 G/DL (ref 14–18)
HGB BLD-MCNC: 9.8 G/DL (ref 14–18)
MCH RBC QN AUTO: 33.8 PG (ref 27–33)
MCHC RBC AUTO-ENTMCNC: 33.8 G/DL (ref 32.3–36.5)
MCV RBC AUTO: 100 FL (ref 81.4–97.8)
PATHOLOGY CONSULT NOTE: NORMAL
PLATELET # BLD AUTO: 120 K/UL (ref 164–446)
PMV BLD AUTO: 11.9 FL (ref 9–12.9)
POTASSIUM SERPL-SCNC: 4.2 MMOL/L (ref 3.6–5.5)
RBC # BLD AUTO: 2.9 M/UL (ref 4.7–6.1)
SODIUM SERPL-SCNC: 134 MMOL/L (ref 135–145)
WBC # BLD AUTO: 4.6 K/UL (ref 4.8–10.8)

## 2025-05-06 PROCEDURE — 85250 CLOT FACTOR IX PTC/CHRSTMAS: CPT

## 2025-05-06 PROCEDURE — 36415 COLL VENOUS BLD VENIPUNCTURE: CPT

## 2025-05-06 PROCEDURE — 85270 CLOT FACTOR XI PTA: CPT

## 2025-05-06 PROCEDURE — 700102 HCHG RX REV CODE 250 W/ 637 OVERRIDE(OP): Performed by: INTERNAL MEDICINE

## 2025-05-06 PROCEDURE — 80048 BASIC METABOLIC PNL TOTAL CA: CPT

## 2025-05-06 PROCEDURE — 85018 HEMOGLOBIN: CPT

## 2025-05-06 PROCEDURE — 99233 SBSQ HOSP IP/OBS HIGH 50: CPT | Mod: 25 | Performed by: INTERNAL MEDICINE

## 2025-05-06 PROCEDURE — A9270 NON-COVERED ITEM OR SERVICE: HCPCS | Performed by: INTERNAL MEDICINE

## 2025-05-06 PROCEDURE — 85240 CLOT FACTOR VIII AHG 1 STAGE: CPT

## 2025-05-06 PROCEDURE — 700105 HCHG RX REV CODE 258: Performed by: INTERNAL MEDICINE

## 2025-05-06 PROCEDURE — 82962 GLUCOSE BLOOD TEST: CPT

## 2025-05-06 PROCEDURE — 94760 N-INVAS EAR/PLS OXIMETRY 1: CPT

## 2025-05-06 PROCEDURE — 85027 COMPLETE CBC AUTOMATED: CPT

## 2025-05-06 PROCEDURE — 85014 HEMATOCRIT: CPT

## 2025-05-06 PROCEDURE — 99497 ADVNCD CARE PLAN 30 MIN: CPT | Performed by: INTERNAL MEDICINE

## 2025-05-06 PROCEDURE — 85732 THROMBOPLASTIN TIME PARTIAL: CPT

## 2025-05-06 RX ORDER — INSULIN LISPRO 100 [IU]/ML
1-6 INJECTION, SOLUTION INTRAVENOUS; SUBCUTANEOUS
Status: DISCONTINUED | OUTPATIENT
Start: 2025-05-06 | End: 2025-05-06 | Stop reason: HOSPADM

## 2025-05-06 RX ORDER — DEXTROSE MONOHYDRATE 25 G/50ML
25 INJECTION, SOLUTION INTRAVENOUS
Status: DISCONTINUED | OUTPATIENT
Start: 2025-05-06 | End: 2025-05-06 | Stop reason: HOSPADM

## 2025-05-06 RX ADMIN — INSULIN LISPRO 1 UNITS: 100 INJECTION, SOLUTION INTRAVENOUS; SUBCUTANEOUS at 11:30

## 2025-05-06 RX ADMIN — FINASTERIDE 5 MG: 5 TABLET, FILM COATED ORAL at 05:40

## 2025-05-06 RX ADMIN — SODIUM CHLORIDE: 9 INJECTION, SOLUTION INTRAVENOUS at 05:41

## 2025-05-06 RX ADMIN — METOPROLOL TARTRATE 25 MG: 25 TABLET, FILM COATED ORAL at 05:40

## 2025-05-06 RX ADMIN — TAMSULOSIN HYDROCHLORIDE 0.4 MG: 0.4 CAPSULE ORAL at 05:40

## 2025-05-06 ASSESSMENT — ENCOUNTER SYMPTOMS
HEADACHES: 0
COUGH: 0
DIZZINESS: 0
NERVOUS/ANXIOUS: 0
VOMITING: 0
SHORTNESS OF BREATH: 0
PALPITATIONS: 0
ABDOMINAL PAIN: 0
FEVER: 0
BLURRED VISION: 0
BACK PAIN: 0
HEARTBURN: 0
CHILLS: 0
DOUBLE VISION: 0
FALLS: 0

## 2025-05-06 ASSESSMENT — PAIN DESCRIPTION - PAIN TYPE: TYPE: ACUTE PAIN;SURGICAL PAIN

## 2025-05-06 ASSESSMENT — LIFESTYLE VARIABLES: SUBSTANCE_ABUSE: 0

## 2025-05-06 NOTE — PROGRESS NOTES
Received bedside patient report from HOLDEN Jeffries. Patient resting comfortably in bed, no complaints at this time. Safety precautions in place. Will continue to monitor.

## 2025-05-06 NOTE — OP REPORT
"DATE OF SERVICE:  05/05/2025     NAME OF OPERATION:  Cystoscopy with evacuation of clot, transurethral   resection of bladder neck, fulguration of bleeding.     PREOPERATIVE DIAGNOSES:  1.  Clot urinary retention.  2.  Prostate cancer.     POSTOPERATIVE DIAGNOSES:  1.  Clot urinary retention.  2.  Prostate cancer.     PRIMARY SURGEON:  Alonzo Monahan MD       ANESTHESIOLOGIST:  Rakesh Sanchez MD     FINDINGS:  1.  Very large prostate.  2.  Calcifications overlying prior resection site at bladder neck with oozing   within.  This region was resected back to clean tissue and cauterized.  3.  Radiation changes of the trigone, especially around the right ureteral   orifice with very small blood vessels oozing, careful spot cautery utilized.     INDICATIONS:  Briefly, the patient is a 70-year-old gentleman with a history   of treated prostate cancer status post radiation therapy.  He also has a known   large prostate and has ongoing problems with recurrent gross hematuria.  He   was previously treated with an operation in 02/2025 with Dr. Carvajal, at which   point a small resection was carried out of the median lobe.  Additionally, the   patient has undergone hyperbaric oxygen treatments.  He has had 3 consecutive   days of problematic gross hematuria leading to 3 emergency room visits and   presents for ongoing management after admission to the hospital.  Of note, the   patient takes Plavix for his heart condition and his purported history of   \"mini strokes.\"        OPERATION IN DETAIL:  The patient was taken to the operating room and placed   on the operating table in the supine position.  After administration of   general anesthetic, he was placed in lithotomy.  Generals were prepped and   draped sterilely.  A 26-Kazakh cystoscope was passed into the bladder.  The   urethra was within normal limits.  The apex of the prostate was not bleeding.    Towards the base, that is the bladder neck, there was some mild " oozing   identified.  The scope was placed inside and an Ellik  was used to   irrigate approximately 100 mL of clot.  Better visualization demonstrated some   mild oozing from the bladder neck and the calcifications overlying this   slightly necrotic tissue.  Additionally, there was some mild oozing along the   trigone, especially the right side.     A bipolar loop was then utilized to resect the necrotic tissue, which   contained bleeding calcifications, back to what appeared to be more healthy   tissue.  Cautery was utilized on spot blood vessels that resulted from this   resection.  Additionally, at the level of the trigone, a few regions of   radiation change were found to be bleeding and spot cauterized.     The Ellik  was used to irrigate the bladder free of all bladder   neck/prostate chips.  The bladder was then rinsed and filled several times   without any evidence of ongoing bleeding.  The bladder was left partially   full.  The scope was removed and an 18-Persian three-way catheter was placed.    The patient was fairly adamant about this size given the comfort compared to   other sizes.  He was placed in continuous bladder irrigation.  He was draining   clear at the case conclusion.     He will be admitted for overnight observation with an attempted void trial   hopefully tomorrow morning pending his results overnight.     Of note, we are interested in the patient exploring alternative   anticoagulation options with his treating physicians.  Given this, will remain   a root cause for this ongoing problem going forward.  We also discussed the   possibility of something such as prostatic arterial embolization to better   control this as well as up to and including simple prostatectomy.  This was   all reviewed with him in the preoperative holding area.        ______________________________  Alonzo Monahan MD MCM/YULIET    DD:  05/05/2025 14:52  DT:  05/05/2025 17:30    Job#:   [id number]

## 2025-05-06 NOTE — PROGRESS NOTES
"1325: Pt called this RN into room, pt dressed and requesting to have IV removed so he can leave. Informed pt that he would be leaving AMA as we are still pending lab results. Voalte message sent to Dr. Oneal to inform. Pt stated that he does not want to stay any longer, \"I already know what's wrong with me, theres no point in me staying another night.\"    1334:Pt verbalized understanding that he would be leaving AMA. IV discontinued and pt ambulated off unit with belongings. Dr. Oneal updated.     Abebe Yin has chosen to leave the hospital against medical advice. The attending physician has not discharged the patient. The provider is aware that the patient is leaving against medical advice. Patient expresses understanding of the risks of leaving the hospital and benefits of admission including but not limited to, the availability and proximity of nurses, physicians, monitoring, diagnostic testing, treatment, and a safe discharge plan. The patient had the opportunity to ask questions about their medical condition and recommended treatment.  Patient is aware that they may return for care at any time.  "

## 2025-05-06 NOTE — ANESTHESIA POSTPROCEDURE EVALUATION
Patient: Abebe Yin    Procedure Summary       Date: 05/05/25 Room / Location:  OR 03 / SURGERY [facility]    Anesthesia Start: 1408 Anesthesia Stop: 1456    Procedure: CYSTOSCOPY/CLOT EVACUATION, FULGURATION OF THE BLADDER NECK (Bladder) Diagnosis: (GROSS HEMATURIA)    Surgeons: Alonzo Monahan M.D. Responsible Provider: Rakesh Sanchez M.D.    Anesthesia Type: general ASA Status: 3            Final Anesthesia Type: general  Last vitals  BP   Blood Pressure : 109/56    Temp   36.4 °C (97.5 °F)    Pulse   (!) 57   Resp   17    SpO2   95 %      Anesthesia Post Evaluation    Patient location during evaluation: PACU  Patient participation: complete - patient participated  Level of consciousness: awake and alert    Airway patency: patent  Anesthetic complications: no  Cardiovascular status: hemodynamically stable  Respiratory status: acceptable  Hydration status: euvolemic    PONV: none          There were no known notable events for this encounter.     Nurse Pain Score: 3 (NPRS)

## 2025-05-06 NOTE — PROGRESS NOTES
4 Eyes Skin Assessment Completed by Nesha RN and Amandeep RN.    Head WDL  Ears WDL  Nose WDL  Mouth WDL  Neck WDL  Breast/Chest WDL  Shoulder Blades WDL  Spine WDL  (R) Arm/Elbow/Hand WDL  (L) Arm/Elbow/Hand WDL  Abdomen WDL  Groin Redness and Blanching  Scrotum/Coccyx/Buttocks WDL  (R) Leg WDL  (L) Leg WDL  (R) Heel/Foot/Toe WDL  (L) Heel/Foot/Toe WDL          Devices In Places Blood Pressure Cuff, Pulse Ox, and Marcial      Interventions In Place Pillows    Possible Skin Injury No    Pictures Uploaded Into Epic N/A  Wound Consult Placed N/A  RN Wound Prevention Protocol Ordered No

## 2025-05-06 NOTE — DISCHARGE SUMMARY
" Patient left AMA    Discharge Summary    CHIEF COMPLAINT ON ADMISSION  Chief Complaint   Patient presents with    Urinary Catheter Problem     Pt has been seen in the ER for the same 5/4 reports it gets clogged       Reason for Admission  Blood in Catheter     Admission Date  5/5/2025    CODE STATUS  Full Code    HPI & HOSPITAL COURSE  As per chart review:  \"69 yo with history of prostate cancer sp EBRT, c/b gross hematuria and radiation cystitis, admitted in Feb 2025 for cystoscopy, clot evacuation/fulguration and TURP (2/14/25) who presents with clogged oh catheter. Pt states since his surgeries in mid February he's had significant pain with urinating and ongoing hematuria. Since 5/3 presented with worsening hematuria, difficulty urinating where he had a oh placed, oh irrigated and discharged with urology fu. He then returned next day on 5/4 with same complaints and once again had manual irrigation and sent home. He once again returns on 5/5 stating he has suprapubic pain, no oh output. Manual irrigation done in ER and oh bag at time of interview was full of dark red urine with clots. Renal US w/o hydronephrosis. He has been on and off plavix and still was taking at time of admission, reports of stroke.\"      Interval Problem Update  5/6: Patient seen at bedside this morning.  Patient still with a Oh catheter, urine seems to be clear at the time of my evaluation.  Urology following the patient, we appreciate further recommendations.  Patient with pancytopenia and elevated APTT.  We have consulted hematology, we appreciate further recommendations.  Pending further studies for bleeding disorder.    UPDATE: I received notification from nursing that the patient wanted to leave AMA.  By the time I went to see the patient at bedside he had already left AMA.  As per nursing Oh catheter was removed.    As per nursing patient \"does not want further testing\" and just wants to go home.    The patient is " at high risk of readmission.  However he has chosen to leave AGAINST MEDICAL ADVICE.    Therefore, he is discharged in guarded and stable condition against medcial advice.        Discharge Date  5/6/2025    FOLLOW UP ITEMS POST DISCHARGE  Patient left AGAINST MEDICAL ADVICE.    DISCHARGE DIAGNOSES  Principal Problem:    Urinary obstruction (POA: Yes)  Active Problems:    Pancytopenia (HCC) (POA: Yes)    Elevated partial thromboplastin time (PTT) (POA: Yes)    S/P TURP (status post transurethral resection of prostate) (POA: Unknown)    Hematuria (POA: Unknown)    History of CVA (cerebrovascular accident) (POA: Unknown)    Prostate cancer (HCC) (POA: Unknown)    Macrocytic anemia (POA: Unknown)    BPH (benign prostatic hyperplasia) (POA: Unknown)    Diabetes (HCC) (POA: Unknown)    Advance care planning (POA: Unknown)  Resolved Problems:    Thrombocytopenia (HCC) (POA: Unknown)      FOLLOW UP  Patient left AGAINST MEDICAL ADVICE.    MEDICATIONS ON DISCHARGE     Medication List        ASK your doctor about these medications        Instructions   alfuzosin 10 MG SR tablet  Commonly known as: Uroxatral   Take 10 mg by mouth every day.  Dose: 10 mg     atorvastatin 20 MG Tabs  Commonly known as: Lipitor   Take 20 mg by mouth at bedtime.  Dose: 20 mg     cephALEXin 500 MG Caps  Commonly known as: Keflex   Take 1 Capsule by mouth 3 times a day for 5 days.  Dose: 500 mg     clopidogrel 75 MG Tabs  Commonly known as: Plavix   Take 75 mg by mouth every morning.  Dose: 75 mg     finasteride 5 MG Tabs  Commonly known as: Proscar   Take 5 mg by mouth every day.  Dose: 5 mg     meloxicam 15 MG tablet  Commonly known as: Mobic   Take 15 mg by mouth at bedtime.  Dose: 15 mg     metformin 1000 MG tablet  Commonly known as: Glucophage   Take 1,000 mg by mouth 2 times a day with meals.  Dose: 1,000 mg     metoprolol tartrate 25 MG Tabs  Commonly known as: Lopressor   Take 25 mg by mouth 2 times a day.  Dose: 25 mg     potassium citrate  SR 10 MEQ (1080 MG) Tbcr  Commonly known as: Urocit-K SR   Take 10 mEq by mouth 2 times a day.  Dose: 10 mEq              Allergies  No Known Allergies    DIET  Orders Placed This Encounter   Procedures    Diet Order Diet: Regular     Standing Status:   Standing     Number of Occurrences:   1     Diet::   Regular [1]       ACTIVITY  Patient left AGAINST MEDICAL ADVICE.    CONSULTATIONS  Urology  Hematology    PROCEDURES  NAME OF OPERATION:  Cystoscopy with evacuation of clot, transurethral   resection of bladder neck, fulguration of bleeding.    Patient left AGAINST MEDICAL ADVICE.    LABORATORY  Lab Results   Component Value Date    SODIUM 134 (L) 05/06/2025    POTASSIUM 4.2 05/06/2025    CHLORIDE 103 05/06/2025    CO2 19 (L) 05/06/2025    GLUCOSE 206 (H) 05/06/2025    BUN 19 05/06/2025    CREATININE 0.86 05/06/2025        Lab Results   Component Value Date    WBC 4.6 (L) 05/06/2025    HEMOGLOBIN 9.7 (L) 05/06/2025    HEMATOCRIT 28.1 (L) 05/06/2025    PLATELETCT 120 (L) 05/06/2025        Patient left AGAINST MEDICAL ADVICE.

## 2025-05-06 NOTE — PROGRESS NOTES
"   Urology Progress Note    Post op Day # 1.Pt is s/p  cysto clot evac and fulguration on 5/5/25 performed by Dr. Monahan. Pt is lying comfortably in bed and denies f/c/n/v or pain. Oh catheter in place draining clear yellow urine with CBI clamped. Hospitalist has held plavix. Creatinine is stable at 0.86, Hgb: 9.8.     Overnight Events: None    S:     O:   /56   Pulse (!) 57   Temp 36.4 °C (97.5 °F) (Temporal)   Resp 17   Ht 1.778 m (5' 10\")   Wt 67.4 kg (148 lb 9.4 oz)   SpO2 95%   Recent Labs     05/05/25  0655 05/06/25  0350   SODIUM 138 134*   POTASSIUM 4.2 4.2   CHLORIDE 102 103   CO2 21 19*   GLUCOSE 140* 206*   BUN 19 19   CREATININE 1.03 0.86   CALCIUM 9.6 8.3*     Recent Labs     05/05/25  0655 05/06/25  0350   WBC 4.8 4.6*   RBC 3.51* 2.90*   HEMOGLOBIN 11.8* 9.8*   HEMATOCRIT 35.6* 29.0*   .4* 100.0*   MCH 33.6* 33.8*   MCHC 33.1 33.8   .7* 97.6*   PLATELETCT 108* 120*   MPV 11.4 11.9         Intake/Output Summary (Last 24 hours) at 5/6/2025 0808  Last data filed at 5/6/2025 0651  Gross per 24 hour   Intake 1587.73 ml   Output 2665 ml   Net -1077.27 ml       Exam:    Urine: 3 way oh in place draining clear yellow urine with CBI clamped.       A/P:    Active Hospital Problems    Diagnosis     Urinary obstruction [N13.9]     S/P TURP (status post transurethral resection of prostate) [Z90.79]     Hematuria [R31.9]     History of CVA (cerebrovascular accident) [Z86.73]     Prostate cancer (HCC) [C61]     Macrocytic anemia [D53.9]     BPH (benign prostatic hyperplasia) [N40.0]     Elevated partial thromboplastin time (PTT) [R79.1]     Pancytopenia (HCC) [D61.818]      -Recommend performing a trial of void this morning.   -Encouraged patient to maintain adequate hydration.   -Patient is scheduled for a follow up with Dr. Carvajal on 5/20/25 to further discuss treatment for his radiation cystitis. He may benefit for a prostatic arterial embolization vs a simple prostatectomy.   -Pt " notes his plavix is prescribed by his neurologist, encouraged pt to follow up to further discuss alternative therapy.   -Urology signing off.    HELENA Ibanez.-C.   [id number] [id number]   [phone]

## 2025-05-06 NOTE — PROGRESS NOTES
Received report from night shift RN, assumed care of pt at 0715. AAOx4, VSS on room air. Pt denies pain or nausea. CBI currently running, urine light yellow. CBI slowed down. Pt updated on plan of care for the day. Safety precautions in place. Pt educated to call for assistance.     0925: CBI is clamped and urine still yellow. 250ml instilled and oh removed per urology nursing communication. Pt tolerated well and able to void 100mls bloody urine with one clot present. Pt educated on monitoring urine output and to call for any discomfort.

## 2025-05-06 NOTE — PROGRESS NOTES
"Hospital Medicine Daily Progress Note    Date of Service  5/6/2025    Chief Complaint  Abebe Yin is a 70 y.o. male admitted 5/5/2025 with hematuria.    Hospital Course  As per chart review:  \"69 yo with history of prostate cancer sp EBRT, c/b gross hematuria and radiation cystitis, admitted in Feb 2025 for cystoscopy, clot evacuation/fulguration and TURP (2/14/25) who presents with clogged oh catheter. Pt states since his surgeries in mid February he's had significant pain with urinating and ongoing hematuria. Since 5/3 presented with worsening hematuria, difficulty urinating where he had a oh placed, oh irrigated and discharged with urology fu. He then returned next day on 5/4 with same complaints and once again had manual irrigation and sent home. He once again returns on 5/5 stating he has suprapubic pain, no oh output. Manual irrigation done in ER and oh bag at time of interview was full of dark red urine with clots. Renal US w/o hydronephrosis. He has been on and off plavix and still was taking at time of admission, reports of stroke.\"     Interval Problem Update  5/6: Patient seen at bedside this morning.  Patient still with a Oh catheter, urine seems to be clear at the time of my evaluation.  Urology following the patient, we appreciate further recommendations.  Patient with pancytopenia and elevated APTT.  We have consulted hematology, we appreciate further recommendations.  Continue to monitor closely.    I have discussed this patient's plan of care and discharge plan at IDT rounds today with Case Management, Nursing, Nursing leadership, and other members of the IDT team.    Consultants/Specialty  urology and hematology    Code Status  Full Code    Disposition  The patient is not medically cleared for discharge to home or a post-acute facility.      Review of Systems  Review of Systems   Constitutional:  Positive for malaise/fatigue. Negative for chills and fever.   HENT:  " Negative for hearing loss and nosebleeds.    Eyes:  Negative for blurred vision and double vision.   Respiratory:  Negative for cough and shortness of breath.    Cardiovascular:  Negative for chest pain and palpitations.   Gastrointestinal:  Negative for abdominal pain, heartburn and vomiting.   Genitourinary:  Positive for hematuria.   Musculoskeletal:  Negative for back pain and falls.   Skin:  Negative for itching and rash.   Neurological:  Negative for dizziness and headaches.   Psychiatric/Behavioral:  Negative for substance abuse. The patient is not nervous/anxious.    All other systems reviewed and are negative.       Physical Exam  Temp:  [36.1 °C (97 °F)-36.9 °C (98.4 °F)] 36.8 °C (98.2 °F)  Pulse:  [54-73] 56  Resp:  [14-18] 18  BP: ()/(50-78) 99/50  SpO2:  [92 %-100 %] 95 %    Physical Exam  Vitals and nursing note reviewed.   Constitutional:       Appearance: He is ill-appearing.   HENT:      Head: Normocephalic and atraumatic.      Right Ear: External ear normal.      Left Ear: External ear normal.      Nose: Nose normal.      Mouth/Throat:      Mouth: Mucous membranes are moist.      Pharynx: Oropharynx is clear.   Eyes:      General:         Right eye: No discharge.         Left eye: No discharge.   Cardiovascular:      Rate and Rhythm: Normal rate and regular rhythm.      Heart sounds: No murmur heard.  Pulmonary:      Effort: Pulmonary effort is normal. No respiratory distress.      Breath sounds: Normal breath sounds.   Abdominal:      General: Abdomen is flat. Bowel sounds are normal. There is no distension.      Palpations: Abdomen is soft.      Tenderness: There is no abdominal tenderness.   Genitourinary:     Comments: Marcial catheter in place  Musculoskeletal:      Cervical back: Normal range of motion and neck supple.      Right lower leg: No edema.      Left lower leg: No edema.   Skin:     General: Skin is warm.   Neurological:      General: No focal deficit present.      Mental  "Status: He is alert and oriented to person, place, and time.   Psychiatric:         Mood and Affect: Mood normal.         Behavior: Behavior normal.         Fluids    Intake/Output Summary (Last 24 hours) at 5/6/2025 1012  Last data filed at 5/6/2025 0920  Gross per 24 hour   Intake 1587.73 ml   Output 865 ml   Net 722.73 ml        Laboratory  Recent Labs     05/05/25  0655 05/06/25  0350 05/06/25  0917   WBC 4.8 4.6*  --    RBC 3.51* 2.90*  --    HEMOGLOBIN 11.8* 9.8* 9.7*   HEMATOCRIT 35.6* 29.0* 28.1*   .4* 100.0*  --    MCH 33.6* 33.8*  --    MCHC 33.1 33.8  --    .7* 97.6*  --    PLATELETCT 108* 120*  --    MPV 11.4 11.9  --      Recent Labs     05/05/25  0655 05/06/25  0350   SODIUM 138 134*   POTASSIUM 4.2 4.2   CHLORIDE 102 103   CO2 21 19*   GLUCOSE 140* 206*   BUN 19 19   CREATININE 1.03 0.86   CALCIUM 9.6 8.3*     Recent Labs     05/05/25  0655   APTT 235.3*   INR 1.06               Imaging  US-RENAL   Final Result      1.  Normal appearing kidneys.   2.  Enlarged prostate.           Assessment/Plan  * Urinary obstruction- (present on admission)  Assessment & Plan  Hospitalized for urinary obstruction from ongoing gross hematuria and likely clots    5/6: Patient underwent: \" Cystoscopy with evacuation of clot, transurethral resection of bladder neck, fulguration of bleeding\" yesterday by urology. We appreciate further recommendations.    Diabetes (HCC)  Assessment & Plan  ISS  Monitor  Pending A1c    BPH (benign prostatic hyperplasia)  Assessment & Plan  \"Sp recent turp  Cw home meds\"    5/6: Patient underwent: \" Cystoscopy with evacuation of clot, transurethral resection of bladder neck, fulguration of bleeding\" yesterday by urology. We appreciate further recommendations.     Macrocytic anemia  Assessment & Plan  \"Pt has had hematuria for some time   Despite reporting significant hematuria past 3 days Hb remains at baseline which is 11-12  Trend H/H daily for now  Transfuse for " "Hb<7\"    5/6: We have consulted hematology, we appreciate further recommendations.  They have requested mixing studies, factor VIII, IX, XI and XII levels.  I have ordered the studies.  We appreciate further recommendations by hematology.    Prostate cancer (HCC)  Assessment & Plan  \"Hx of prostate cancer   Sp EBRt  C/b radiation cystitis   Outpatient fu\"    5/6: We have consulted hematology/oncology to the patient's pancytopenia and elevated APTT, we appreciate further recommendations.    History of CVA (cerebrovascular accident)  Assessment & Plan  5/6: Continue statin, holding Plavix for now.    Hematuria  Assessment & Plan  5/6: Patient underwent: \" Cystoscopy with evacuation of clot, transurethral resection of bladder neck, fulguration of bleeding\" yesterday by urology. We appreciate further recommendations.    S/P TURP (status post transurethral resection of prostate)  Assessment & Plan  Noted, performed on 2/14/2025  Per patient still a lot of pain and bleeding    5/6: Patient underwent: \" Cystoscopy with evacuation of clot, transurethral resection of bladder neck, fulguration of bleeding\" yesterday by urology. We appreciate further recommendations.    Elevated partial thromboplastin time (PTT)- (present on admission)  Assessment & Plan  \"Patient oddly has had a chronically rather elevated PTT  D dimer in the past neg  INR nl  I did sent von willebrand profile, vitamin K level and the thrombophilia panel\"    5/6: We have consulted hematology, we appreciate further recommendations. They have requested mixing studies, factor VIII, IX, XI and XII levels. I have ordered the studies. We appreciate further recommendations by hematology.     Pancytopenia (HCC)- (present on admission)  Assessment & Plan  Per previous hospitalist:  \"From chart review appears chronic  I wonder if this is related to his BPH medicine alfuzosin but per pharmacy unlikely  Significantly high PTT also concerning for underlying bleeding " "disorder  Lower platelets also likely linked to plavix use  Anemia multifactorial, but at baseline\"    5/6: We have consulted hematology, we appreciate further recommendations.  They have requested mixing studies, factor VIII, IX, XI and XII levels.  I have ordered the studies.  We appreciate further recommendations by hematology.    Advance care planning  Assessment & Plan  5/6: I discussed with patient for at least 16 further goals of care.  This included CODE STATUS which includes full code and DNR/DNI.  The patient would like to be full code.  We also discussed further treatment goals.  For now the patient would like to have full treatment options available.  This includes invasive or noninvasive procedures as needed.  In the event that the patient cannot make decisions for himself he would like his son Abebe to make decisions for him.         VTE prophylaxis: SCDs    I have performed a physical exam and reviewed and updated ROS and Plan today (5/6/2025). In review of yesterday's note (5/5/2025), there are no changes except as documented above.      I spend at least 51 minutes providing care for this patient.  This included face-to-face interview, physical examination.  Review of lab work including CBC, hemoglobin, BMP.  Discussing with urology.  Consulting and discussing with hematology.  Discussing with multidisciplinary team including case management, nursing staff and pharmacy.  Creating plan of care, reviewing orders.    Moreover, I discussed with patient for at least 16 further goals of care.  This included CODE STATUS which includes full code and DNR/DNI.  The patient would like to be full code.  We also discussed further treatment goals.  For now the patient would like to have full treatment options available.  This includes invasive or noninvasive procedures as needed.  In the event that the patient cannot make decisions for himself he would like his son Abebe to make decisions for him.  "

## 2025-05-06 NOTE — ASSESSMENT & PLAN NOTE
5/6: I discussed with patient for at least 16 further goals of care.  This included CODE STATUS which includes full code and DNR/DNI.  The patient would like to be full code.  We also discussed further treatment goals.  For now the patient would like to have full treatment options available.  This includes invasive or noninvasive procedures as needed.  In the event that the patient cannot make decisions for himself he would like his son Abebe to make decisions for him.

## 2025-05-06 NOTE — CARE PLAN
The patient is Stable - Low risk of patient condition declining or worsening    Shift Goals  Clinical Goals: monitor CBI- urine will clear up and remain clear, pain tolerable at 3/10 or less  Patient Goals: comfort, go home today    Progress made toward(s) clinical / shift goals:  CBI discontinued and pt voiding. Pt left AMA.     Patient is not progressing towards the following goals:

## 2025-05-06 NOTE — PROGRESS NOTES
4 Eyes Skin Assessment Completed by Nesha RN and Amandeep RN.    Head WDL  Ears WDL  Nose WDL  Mouth WDL  Neck WDL  Breast/Chest WDL  Shoulder Blades WDL  Spine WDL  (R) Arm/Elbow/Hand WDL  (L) Arm/Elbow/Hand WDL  Abdomen WDL  Groin Redness and Blanching  Scrotum/Coccyx/Buttocks Redness and Blanching  (R) Leg WDL  (L) Leg WDL  (R) Heel/Foot/Toe WDL  (L) Heel/Foot/Toe WDL          Devices In Places Blood Pressure Cuff, Pulse Ox, Marcial, and SCD's      Interventions In Place Pillows    Possible Skin Injury No    Pictures Uploaded Into Epic N/A  Wound Consult Placed N/A  RN Wound Prevention Protocol Ordered No

## 2025-05-07 LAB
APTT 1H NP PPP: 30.3 SEC (ref 24.7–36)
APTT 1H P INC POOL PPP: 28.9 SEC (ref 24.7–36)
APTT 1H P INC PPP: 166.6 SEC (ref 24.7–36)
APTT IMM NP PPP: 28.9 SEC (ref 24.7–36)
APTT POOL PPP: 29.4 SEC (ref 24.7–36)
APTT PPP: 181.7 SEC (ref 24.7–36)
AT III ACT/NOR PPP CHRO: 107 % (ref 76–128)
FACT VIII ACT/NOR PPP: 159 % (ref 45–145)
INHIBITOR INDICATED 1863: NO
MIXING STUDIES PPP-IMP: ABNORMAL
PROT C ACT/NOR PPP: 94 % (ref 83–168)
PROT S ACT/NOR PPP: 66 % (ref 66–143)

## 2025-05-08 LAB
FACT IX ACT/NOR PPP: 121 % (ref 78–184)
FACT XI ACT/NOR PPP: 107 % (ref 56–153)

## 2025-05-09 LAB
F5 P.R506Q BLD/T QL: NEGATIVE
FACT VIII ACT/NOR PPP: 111 % (ref 56–191)
VWF AG ACT/NOR PPP IA: 100 % (ref 52–214)
VWF:RCO ACT/NOR PPP PL AGG: 113 % (ref 51–215)

## 2025-05-10 LAB
F2 C.20210G>A GENO BLD/T: ABNORMAL
PHYTONADIONE SERPL-MCNC: 0.2 NMOL/L (ref 0.22–4.88)

## 2025-05-12 DIAGNOSIS — D68.2 FACTOR XII DEFICIENCY (HCC): ICD-10-CM

## 2025-08-21 ENCOUNTER — HOSPITAL ENCOUNTER (OUTPATIENT)
Facility: MEDICAL CENTER | Age: 70
End: 2025-08-21
Attending: INTERNAL MEDICINE
Payer: MEDICARE

## 2025-08-21 LAB
ACANTHOCYTES BLD QL SMEAR: NORMAL
ALBUMIN SERPL BCP-MCNC: 4.9 G/DL (ref 3.2–4.9)
ALBUMIN/GLOB SERPL: 2 G/DL
ALP SERPL-CCNC: 106 U/L (ref 30–99)
ALT SERPL-CCNC: 14 U/L (ref 2–50)
ANION GAP SERPL CALC-SCNC: 15 MMOL/L (ref 7–16)
APTT PPP: 180.7 SEC (ref 24.7–36)
AST SERPL-CCNC: 15 U/L (ref 12–45)
BASOPHILS # BLD AUTO: 0 % (ref 0–1.8)
BASOPHILS # BLD: 0 K/UL (ref 0–0.12)
BILIRUB SERPL-MCNC: 1.1 MG/DL (ref 0.1–1.5)
BUN SERPL-MCNC: 18 MG/DL (ref 8–22)
CALCIUM ALBUM COR SERPL-MCNC: 8.7 MG/DL (ref 8.5–10.5)
CALCIUM SERPL-MCNC: 9.4 MG/DL (ref 8.5–10.5)
CFT BLD TEG: >17 MIN (ref 4.6–9.1)
CFT P HPASE BLD TEG: >17 MIN (ref 4.3–8.3)
CHLORIDE SERPL-SCNC: 103 MMOL/L (ref 96–112)
CLOT ANGLE BLD TEG: <39 DEGREES (ref 63–78)
CO2 SERPL-SCNC: 21 MMOL/L (ref 20–33)
CREAT SERPL-MCNC: 0.99 MG/DL (ref 0.5–1.4)
CT.EXTRINSIC BLD ROTEM: >5 MIN (ref 0.8–2.1)
DACRYOCYTES BLD QL SMEAR: NORMAL
EOSINOPHIL # BLD AUTO: 0 K/UL (ref 0–0.51)
EOSINOPHIL NFR BLD: 0 % (ref 0–6.9)
ERYTHROCYTE [DISTWIDTH] IN BLOOD BY AUTOMATED COUNT: 93 FL (ref 35.9–50)
FASTING STATUS PATIENT QL REPORTED: NORMAL
GFR SERPLBLD CREATININE-BSD FMLA CKD-EPI: 83 ML/MIN/1.73 M 2
GLOBULIN SER CALC-MCNC: 2.4 G/DL (ref 1.9–3.5)
GLUCOSE SERPL-MCNC: 147 MG/DL (ref 65–99)
HCT VFR BLD AUTO: 35.8 % (ref 42–52)
HGB BLD-MCNC: 12.3 G/DL (ref 14–18)
INR PPP: 1.08 (ref 0.87–1.13)
IRON SATN MFR SERPL: 36 % (ref 15–55)
IRON SERPL-MCNC: 85 UG/DL (ref 50–180)
LDH SERPL L TO P-CCNC: 172 U/L (ref 107–266)
LYMPHOCYTES # BLD AUTO: 0.99 K/UL (ref 1–4.8)
LYMPHOCYTES NFR BLD: 22 % (ref 22–41)
MANUAL DIFF BLD: NORMAL
MCF BLD TEG: 48.2 MM (ref 52–69)
MCF.PLATELET INHIB BLD ROTEM: 17.6 MM (ref 15–32)
MCH RBC QN AUTO: 34.2 PG (ref 27–33)
MCHC RBC AUTO-ENTMCNC: 34.4 G/DL (ref 32.3–36.5)
MCV RBC AUTO: 99.4 FL (ref 81.4–97.8)
MONOCYTES # BLD AUTO: 0.54 K/UL (ref 0–0.85)
MONOCYTES NFR BLD AUTO: 12 % (ref 0–13.4)
NEUTROPHILS # BLD AUTO: 2.97 K/UL (ref 1.82–7.42)
NEUTROPHILS NFR BLD: 62 % (ref 44–72)
NEUTS BAND NFR BLD MANUAL: 4 % (ref 0–10)
NRBC # BLD AUTO: 0.04 K/UL
NRBC BLD-RTO: 0.9 /100 WBC (ref 0–0.2)
OVALOCYTES BLD QL SMEAR: NORMAL
PA AA BLD-ACNC: ABNORMAL % (ref 0–11)
PA ADP BLD-ACNC: ABNORMAL % (ref 0–17)
PLATELET # BLD AUTO: 139 K/UL (ref 164–446)
PLATELET BLD QL SMEAR: NORMAL
PMV BLD AUTO: 11.8 FL (ref 9–12.9)
POIKILOCYTOSIS BLD QL SMEAR: NORMAL
POTASSIUM SERPL-SCNC: 4.2 MMOL/L (ref 3.6–5.5)
PROT SERPL-MCNC: 7.3 G/DL (ref 6–8.2)
PROTHROMBIN TIME: 14.4 SEC (ref 12–14.6)
RBC # BLD AUTO: 3.6 M/UL (ref 4.7–6.1)
RBC BLD AUTO: PRESENT
SODIUM SERPL-SCNC: 139 MMOL/L (ref 135–145)
TEG ALGORITHM TGALG: ABNORMAL
TIBC SERPL-MCNC: 239 UG/DL (ref 250–450)
UIBC SERPL-MCNC: 154 UG/DL (ref 110–370)
VARIANT LYMPHS BLD QL SMEAR: NORMAL
WBC # BLD AUTO: 4.5 K/UL (ref 4.8–10.8)

## 2025-08-21 PROCEDURE — 85027 COMPLETE CBC AUTOMATED: CPT

## 2025-08-21 PROCEDURE — 85250 CLOT FACTOR IX PTC/CHRSTMAS: CPT

## 2025-08-21 PROCEDURE — 85730 THROMBOPLASTIN TIME PARTIAL: CPT

## 2025-08-21 PROCEDURE — 85291 CLOT FACTOR XIII FIBRIN SCRN: CPT

## 2025-08-21 PROCEDURE — 85240 CLOT FACTOR VIII AHG 1 STAGE: CPT

## 2025-08-21 PROCEDURE — 85347 COAGULATION TIME ACTIVATED: CPT

## 2025-08-21 PROCEDURE — 85576 BLOOD PLATELET AGGREGATION: CPT

## 2025-08-21 PROCEDURE — 85384 FIBRINOGEN ACTIVITY: CPT | Mod: 91

## 2025-08-21 PROCEDURE — 80053 COMPREHEN METABOLIC PANEL: CPT

## 2025-08-21 PROCEDURE — 83615 LACTATE (LD) (LDH) ENZYME: CPT

## 2025-08-21 PROCEDURE — 83540 ASSAY OF IRON: CPT

## 2025-08-21 PROCEDURE — 85610 PROTHROMBIN TIME: CPT

## 2025-08-21 PROCEDURE — 36415 COLL VENOUS BLD VENIPUNCTURE: CPT

## 2025-08-21 PROCEDURE — 85007 BL SMEAR W/DIFF WBC COUNT: CPT

## 2025-08-21 PROCEDURE — 83550 IRON BINDING TEST: CPT

## 2025-08-23 LAB
FACT IX ACT/NOR PPP: 125 % (ref 78–184)
FACT VIII ACT/NOR PPP: 112 % (ref 56–191)

## 2025-08-24 LAB — FACT XIII CLOT DIS 24H PPP QL: NORMAL

## (undated) DEVICE — JELLY SURGILUBE STERILE FOIL 5 GM (150EA/BX)

## (undated) DEVICE — SPONGE GAUZESTER 4 X 4 4PLY - (128PK/CA)

## (undated) DEVICE — SUCTION INSTRUMENT YANKAUER BULBOUS TIP W/O VENT (50EA/CA)

## (undated) DEVICE — HUMID-VENT HEAT AND MOISTURE EXCHANGE- (50/BX)

## (undated) DEVICE — GOWN WARMING STANDARD FLEX - (30/CA)

## (undated) DEVICE — SET IRRIGATION CYSTOSCOPY Y-TYPE L81 IN (20EA/CA)

## (undated) DEVICE — WATER IRRIGATION STERILE 1000ML (12EA/CA)

## (undated) DEVICE — TUBE CONNECTING SUCTION - CLEAR PLASTIC STERILE 72 IN (50EA/CA)

## (undated) DEVICE — TOWEL STOP TIMEOUT SAFETY FLAG (40EA/CA)

## (undated) DEVICE — SUTURE GENERAL

## (undated) DEVICE — PACK CYSTOSCOPY III - (8/CA)

## (undated) DEVICE — ELECTRODE DUAL RETURN W/ CORD - (50/PK)

## (undated) DEVICE — SODIUM CHL. IRRIGATION 0.9% 3000ML (4EA/CA 65CA/PF)

## (undated) DEVICE — GLOVE BIOGEL SZ 7.5 SURGICAL PF LTX - (50PR/BX 4BX/CA)

## (undated) DEVICE — GOWN SURGICAL X-LARGE ULTRA - FILM-REINFORCED (20/CA)

## (undated) DEVICE — COVER LIGHT HANDLE FLEXIBLE - SOFT (2EA/PK 80PK/CA)

## (undated) DEVICE — SENSOR OXIMETER ADULT SPO2 RD SET (20EA/BX)

## (undated) DEVICE — BAG SPONGE COUNT 10.25 X 32 - BLUE (250/CA)

## (undated) DEVICE — SODIUM CHL IRRIGATION 0.9% 1000ML (12EA/CA)

## (undated) DEVICE — GOWN SURGEONS X-LARGE - DISP. (30/CA)

## (undated) DEVICE — CANISTER SUCTION RIGID RED 1500CC (40EA/CA)